# Patient Record
Sex: MALE | Race: WHITE | Employment: FULL TIME | URBAN - METROPOLITAN AREA
[De-identification: names, ages, dates, MRNs, and addresses within clinical notes are randomized per-mention and may not be internally consistent; named-entity substitution may affect disease eponyms.]

---

## 2020-10-12 PROBLEM — N20.0 BILATERAL KIDNEY STONES: Status: ACTIVE | Noted: 2019-10-01

## 2020-10-12 PROBLEM — U07.1 COVID-19: Status: ACTIVE | Noted: 2020-10-12

## 2020-10-19 PROBLEM — U07.1 PNEUMONIA DUE TO COVID-19 VIRUS: Status: ACTIVE | Noted: 2020-10-19

## 2020-10-19 PROBLEM — J12.82 PNEUMONIA DUE TO COVID-19 VIRUS: Status: ACTIVE | Noted: 2020-10-19

## 2020-10-20 ENCOUNTER — HOSPITAL ENCOUNTER (INPATIENT)
Age: 55
LOS: 6 days | Discharge: HOME | DRG: 871 | End: 2020-10-26
Attending: INTERNAL MEDICINE | Admitting: INTERNAL MEDICINE
Payer: COMMERCIAL

## 2020-10-20 PROBLEM — J96.01 ACUTE RESPIRATORY FAILURE WITH HYPOXIA (HCC): Status: ACTIVE | Noted: 2020-10-20

## 2020-10-20 PROBLEM — F51.01 PRIMARY INSOMNIA: Status: ACTIVE | Noted: 2020-10-20

## 2020-10-20 LAB
ABO/RH: NORMAL
ANTIBODY SCREEN: NEGATIVE
ARM BAND NUMBER: NORMAL
BLOOD BANK SPECIMEN: NORMAL
EXPIRATION DATE: NORMAL

## 2020-10-20 PROCEDURE — 86900 BLOOD TYPING SEROLOGIC ABO: CPT

## 2020-10-20 PROCEDURE — 99222 1ST HOSP IP/OBS MODERATE 55: CPT | Performed by: INTERNAL MEDICINE

## 2020-10-20 PROCEDURE — 86901 BLOOD TYPING SEROLOGIC RH(D): CPT

## 2020-10-20 PROCEDURE — XW033E5 INTRODUCTION OF REMDESIVIR ANTI-INFECTIVE INTO PERIPHERAL VEIN, PERCUTANEOUS APPROACH, NEW TECHNOLOGY GROUP 5: ICD-10-PCS | Performed by: INTERNAL MEDICINE

## 2020-10-20 PROCEDURE — 2580000003 HC RX 258: Performed by: NURSE PRACTITIONER

## 2020-10-20 PROCEDURE — 2500000003 HC RX 250 WO HCPCS: Performed by: INTERNAL MEDICINE

## 2020-10-20 PROCEDURE — 97530 THERAPEUTIC ACTIVITIES: CPT

## 2020-10-20 PROCEDURE — 2000000000 HC ICU R&B

## 2020-10-20 PROCEDURE — 2580000003 HC RX 258: Performed by: INTERNAL MEDICINE

## 2020-10-20 PROCEDURE — 97161 PT EVAL LOW COMPLEX 20 MIN: CPT

## 2020-10-20 PROCEDURE — 6370000000 HC RX 637 (ALT 250 FOR IP): Performed by: NURSE PRACTITIONER

## 2020-10-20 PROCEDURE — 99255 IP/OBS CONSLTJ NEW/EST HI 80: CPT | Performed by: INTERNAL MEDICINE

## 2020-10-20 PROCEDURE — 6370000000 HC RX 637 (ALT 250 FOR IP): Performed by: INTERNAL MEDICINE

## 2020-10-20 PROCEDURE — 86850 RBC ANTIBODY SCREEN: CPT

## 2020-10-20 PROCEDURE — 83605 ASSAY OF LACTIC ACID: CPT

## 2020-10-20 PROCEDURE — 6360000002 HC RX W HCPCS: Performed by: NURSE PRACTITIONER

## 2020-10-20 PROCEDURE — XW13325 TRANSFUSION OF CONVALESCENT PLASMA (NONAUTOLOGOUS) INTO PERIPHERAL VEIN, PERCUTANEOUS APPROACH, NEW TECHNOLOGY GROUP 5: ICD-10-PCS | Performed by: INTERNAL MEDICINE

## 2020-10-20 PROCEDURE — 6360000002 HC RX W HCPCS: Performed by: INTERNAL MEDICINE

## 2020-10-20 RX ORDER — ALBUTEROL SULFATE 2.5 MG/3ML
2.5 SOLUTION RESPIRATORY (INHALATION)
Status: DISCONTINUED | OUTPATIENT
Start: 2020-10-20 | End: 2020-10-26 | Stop reason: HOSPADM

## 2020-10-20 RX ORDER — ACETAMINOPHEN 650 MG/1
650 SUPPOSITORY RECTAL EVERY 6 HOURS PRN
Status: DISCONTINUED | OUTPATIENT
Start: 2020-10-20 | End: 2020-10-26 | Stop reason: HOSPADM

## 2020-10-20 RX ORDER — ACETAMINOPHEN 325 MG/1
650 TABLET ORAL EVERY 6 HOURS PRN
Status: DISCONTINUED | OUTPATIENT
Start: 2020-10-20 | End: 2020-10-26 | Stop reason: HOSPADM

## 2020-10-20 RX ORDER — POLYVINYL ALCOHOL 14 MG/ML
1 SOLUTION/ DROPS OPHTHALMIC PRN
Status: DISCONTINUED | OUTPATIENT
Start: 2020-10-20 | End: 2020-10-26 | Stop reason: HOSPADM

## 2020-10-20 RX ORDER — NICOTINE 21 MG/24HR
1 PATCH, TRANSDERMAL 24 HOURS TRANSDERMAL DAILY PRN
Status: DISCONTINUED | OUTPATIENT
Start: 2020-10-20 | End: 2020-10-26 | Stop reason: HOSPADM

## 2020-10-20 RX ORDER — SODIUM CHLORIDE 0.9 % (FLUSH) 0.9 %
10 SYRINGE (ML) INJECTION PRN
Status: DISCONTINUED | OUTPATIENT
Start: 2020-10-20 | End: 2020-10-26 | Stop reason: HOSPADM

## 2020-10-20 RX ORDER — SODIUM CHLORIDE 0.9 % (FLUSH) 0.9 %
10 SYRINGE (ML) INJECTION EVERY 12 HOURS SCHEDULED
Status: DISCONTINUED | OUTPATIENT
Start: 2020-10-20 | End: 2020-10-26 | Stop reason: HOSPADM

## 2020-10-20 RX ORDER — PROMETHAZINE HYDROCHLORIDE 12.5 MG/1
12.5 TABLET ORAL EVERY 6 HOURS PRN
Status: DISCONTINUED | OUTPATIENT
Start: 2020-10-20 | End: 2020-10-26 | Stop reason: HOSPADM

## 2020-10-20 RX ORDER — IPRATROPIUM BROMIDE AND ALBUTEROL SULFATE 2.5; .5 MG/3ML; MG/3ML
1 SOLUTION RESPIRATORY (INHALATION)
Status: DISCONTINUED | OUTPATIENT
Start: 2020-10-20 | End: 2020-10-20

## 2020-10-20 RX ORDER — 0.9 % SODIUM CHLORIDE 0.9 %
20 INTRAVENOUS SOLUTION INTRAVENOUS ONCE
Status: COMPLETED | OUTPATIENT
Start: 2020-10-20 | End: 2020-10-20

## 2020-10-20 RX ORDER — ONDANSETRON 2 MG/ML
4 INJECTION INTRAMUSCULAR; INTRAVENOUS EVERY 6 HOURS PRN
Status: DISCONTINUED | OUTPATIENT
Start: 2020-10-20 | End: 2020-10-26 | Stop reason: HOSPADM

## 2020-10-20 RX ORDER — SODIUM CHLORIDE 9 MG/ML
INJECTION, SOLUTION INTRAVENOUS CONTINUOUS
Status: DISCONTINUED | OUTPATIENT
Start: 2020-10-20 | End: 2020-10-20

## 2020-10-20 RX ORDER — 0.9 % SODIUM CHLORIDE 0.9 %
30 INTRAVENOUS SOLUTION INTRAVENOUS PRN
Status: DISCONTINUED | OUTPATIENT
Start: 2020-10-20 | End: 2020-10-26 | Stop reason: HOSPADM

## 2020-10-20 RX ORDER — ZOLPIDEM TARTRATE 5 MG/1
5 TABLET ORAL NIGHTLY PRN
Status: DISCONTINUED | OUTPATIENT
Start: 2020-10-20 | End: 2020-10-26 | Stop reason: HOSPADM

## 2020-10-20 RX ADMIN — ENOXAPARIN SODIUM 40 MG: 40 INJECTION SUBCUTANEOUS at 21:03

## 2020-10-20 RX ADMIN — SODIUM CHLORIDE, PRESERVATIVE FREE 10 ML: 5 INJECTION INTRAVENOUS at 15:56

## 2020-10-20 RX ADMIN — SODIUM CHLORIDE 20 ML: 0.9 INJECTION, SOLUTION INTRAVENOUS at 18:32

## 2020-10-20 RX ADMIN — REMDESIVIR 200 MG: 100 INJECTION, POWDER, LYOPHILIZED, FOR SOLUTION INTRAVENOUS at 15:20

## 2020-10-20 RX ADMIN — DEXAMETHASONE 6 MG: 4 TABLET ORAL at 17:41

## 2020-10-20 RX ADMIN — Medication 500 MG: at 21:03

## 2020-10-20 RX ADMIN — ACETAMINOPHEN 650 MG: 325 TABLET ORAL at 21:08

## 2020-10-20 RX ADMIN — ZOLPIDEM TARTRATE 5 MG: 5 TABLET ORAL at 22:44

## 2020-10-20 RX ADMIN — SODIUM CHLORIDE, PRESERVATIVE FREE 10 ML: 5 INJECTION INTRAVENOUS at 20:06

## 2020-10-20 RX ADMIN — SODIUM CHLORIDE: 9 INJECTION, SOLUTION INTRAVENOUS at 15:20

## 2020-10-20 RX ADMIN — CEFTRIAXONE SODIUM 1 G: 1 INJECTION, POWDER, FOR SOLUTION INTRAMUSCULAR; INTRAVENOUS at 20:06

## 2020-10-20 RX ADMIN — POLYVINYL ALCOHOL 1 DROP: 14 SOLUTION/ DROPS OPHTHALMIC at 22:44

## 2020-10-20 ASSESSMENT — PAIN DESCRIPTION - FREQUENCY: FREQUENCY: INTERMITTENT

## 2020-10-20 ASSESSMENT — ENCOUNTER SYMPTOMS
COLOR CHANGE: 0
SHORTNESS OF BREATH: 1
COUGH: 1
ANAL BLEEDING: 0
DIARRHEA: 1
TACHYPNEA: 1
EYE ITCHING: 0
CHEST TIGHTNESS: 0

## 2020-10-20 ASSESSMENT — PAIN DESCRIPTION - LOCATION: LOCATION: BACK

## 2020-10-20 ASSESSMENT — PAIN DESCRIPTION - ONSET: ONSET: ON-GOING

## 2020-10-20 ASSESSMENT — PAIN DESCRIPTION - DESCRIPTORS: DESCRIPTORS: ACHING

## 2020-10-20 ASSESSMENT — PAIN DESCRIPTION - ORIENTATION: ORIENTATION: LOWER

## 2020-10-20 ASSESSMENT — PAIN DESCRIPTION - PROGRESSION: CLINICAL_PROGRESSION: NOT CHANGED

## 2020-10-20 ASSESSMENT — PAIN DESCRIPTION - PAIN TYPE: TYPE: ACUTE PAIN

## 2020-10-20 ASSESSMENT — PAIN SCALES - GENERAL
PAINLEVEL_OUTOF10: 0
PAINLEVEL_OUTOF10: 6

## 2020-10-20 NOTE — CARE COORDINATION
Case Management Initial Discharge Plan  zaria Carson  Called patient to verify information       Information verified: address, contacts, phone number, , insurance Yes    Emergency Contact/Next of Kin name & number: sister nabila    PCP: Isidra Jean MD  Date of last visit:   See pcp every 6 months    Insurance Provider: medical mutual    Discharge Planning    Living Arrangements:    lives alone  Support Systems:   family    Home has 2 stories    Location of bedroom/bathroom in home 2nd    Patient able to perform ADL's:Independent    Current Services (outpatient & in home) none  DME equipment: none  pharmacy: [facility]    Receiving oral anticoagulation therapy? No          Potential Assistance Needed:   f/u pcp        Prior SNF/Rehab Placement and Facility: none       Evaluation: no    Expected Discharge date:   10/24    Patient expects to be discharged to:   home  Follow Up Appointment: Best Day/ Time:  afternoon    Transportation provider: family  Transportation arrangements needed for discharge: no    Readmission Risk              Risk of Unplanned Readmission:        15             Does patient have a readmission risk score greater than 14?: Yes  If yes, follow-up appointment must be made within 7 days of discharge.      Goals of Care: return to adl without shortness of breath       Discharge Plan: return home independent           Electronically signed by Rosa Maria Mesa RN on 10/20/20 at 5:55 PM EDT

## 2020-10-20 NOTE — H&P
[facility]  Office: [address], DO, Lux Kras, DO, Solange Sebastian, DO, Valente Peyman Blood, DO, Hans Gee MD, Darcie Montes MD, Taylor Ordaz MD, Macho Juarez MD, Jim Mello MD, Justin Dallas MD, Karen Arthur MD, Rossy Mckeon MD, Ranjit Bee MD, Heaven Oropeza, DO, Nancy Hernandez MD, Demond Hsu MD, Pasquale Rushing, DO, Ary Wellington MD,  Presley Daniels, DO, Kaiden Villatoro MD, Amaris Rios MD, Roland Monsalve [facility], [facility], CNP, Yon Castellano, CNP, Gretchen Mayo, CNS, Mohit Arita, CNP, Emanuel Section, CNP, Gurvinder Cary, CNP, Karina Hines, CNP, Charlene Solders, CNP, Kj Pascual PA-C, Victorino Gutierrez DNP, Bobo Kidd, CNP, Orysia Soda, CNP, Wyottonielia Husbands, CNP, Colletta Moros, CNP, Shana Trujillo, [facility]    HISTORY AND PHYSICAL EXAMINATION            Date:   10/20/2020  Patient name:  Magnolia Tamez  Date of admission:  10/20/2020 11:14 AM  MRN:   [id number]  Account:  [de-identified]  YOB: 1965  PCP:    Bre Wilcox MD  Room:   [address] [place] [address]  Code Status:    Full Code    Chief Complaint:     No chief complaint on file. SOB,     History Obtained From:     patient    History of Present Illness:     Magnolia Tamez is a 60 y.o. Non-/non  male who presents with No chief complaint on file. and is admitted to the hospital for the management of Acute respiratory failure with hypoxia ([facility] [address]   A 38-year-old gentleman with underlying history of hypertension presented with increased shortness of breath which started few days back, ended up into the ER with increasing need of oxygen, his shortness of breath and cough started on October 9, tested positive for Covid on October 12 subsequently got worse and more short of breath and went to the local ER on October 19 with diffuse Covid related pneumonia, needing oxygen, D-dimer was 820, CT of the chest did not show increase in urination, hot or cold intolerance  MUSCULOSKELETAL:  negative joint pains, muscle aches, swelling of joints  NEUROLOGICAL:  negative for headaches, dizziness, lightheadedness, numbness, pain, tingling extremities  BEHAVIOR/PSYCH:  negative for depression, anxiety    Physical Exam:   BP (!) 123/59   Pulse 94   Resp 25   Ht 5' 9\" (1.753 m)   Wt 230 lb 9.6 oz (104.6 kg)   SpO2 91%   BMI 34.05 kg/m²   Temp (24hrs), Av.3 °F (37.9 °C), Min:97.7 °F (36.5 °C), Max:102.7 °F (39.3 °C)    No results for input(s): POCGLU in the last 72 hours.   No intake or output data in the 24 hours ending 10/20/20 1702    General Appearance: alert, well appearing, and in no acute distress  Mental status: oriented to person, place, and time  Head: normocephalic, atraumatic  Eye: no icterus, redness, pupils equal and reactive, extraocular eye movements intact, conjunctiva clear  Ear: normal external ear, no discharge, hearing intact  Nose: no drainage noted  Mouth: mucous membranes moist  Neck: supple, no carotid bruits, thyroid not palpable  Lungs: Bilateral coarse breath sounds,  Cardiovascular: normal rate, regular rhythm, no murmur, gallop, rub  Abdomen: Soft, nontender, nondistended, normal bowel sounds, no hepatomegaly or splenomegaly  Neurologic: There are no new focal motor or sensory deficits, normal muscle tone and bulk, no abnormal sensation, normal speech, cranial nerves II through XII grossly intact  Skin: No gross lesions, rashes, bruising or bleeding on exposed skin area  Extremities: peripheral pulses palpable, no pedal edema or calf pain with palpation  Psych: normal affect    Investigations:      Laboratory Testing:  Recent Results (from the past 24 hour(s))   CK    Collection Time: 10/20/20  4:43 AM   Result Value Ref Range     49 - 397 U/L   Comprehensive Metabolic Panel w/ Reflex to MG    Collection Time: 10/20/20  4:43 AM   Result Value Ref Range    Glucose 173 (H) 65 - 100 mg/dL    BUN 17 9 - 20 mg/dL    Creatinine 0.72 0.66 - 1.25 mg/dL    Sodium 139 135 - 145 mEq/L    Potassium 4.5 3.6 - 5.0 mEq/L    Chloride 105 98 - 107 mEq/L    CO2 22 22 - 32 mEq/L    Calcium 7.9 (L) 8.4 - 10.2 mg/dL    Alb 3.2 (L) 3.5 - 5.0 g/dL    Total Protein 6.1 (L) 6.3 - 8.2 g/dL    Total Bilirubin 1.0 0.2 - 1.3 mg/dL    Alkaline Phosphatase 62 38 - 126 U/L     (H) 17 - 59 U/L     (H) 0 - 50 U/L    EGFR IF NonAfrican American 130 >60 mL/min/1.73m2    eGFR African American 112 >60 mL/min/1.73m2   Lactic acid, plasma    Collection Time: 10/20/20  4:43 AM   Result Value Ref Range    Lactic Acid 1.6 1.0 - 2.2 mmol/L   Magnesium    Collection Time: 10/20/20  4:43 AM   Result Value Ref Range    Magnesium 2.0 1.6 - 2.3 mg/dL   CBC Auto Differential    Collection Time: 10/20/20  4:43 AM   Result Value Ref Range    WBC 11.1 (H) 3.7 - 11.0 10 X 3/mm^3    RBC 4.50 (L) 4.70 - 6.10 10 X 6/mm^3    Hemoglobin 14.1 13.5 - 17.5 g/dL    Hematocrit 42.1 42.0 - 52.0 %    MCV 94 80 - 100 fL    MCH 31.3 27.0 - 35.0 pg    MCHC 33.5 32.0 - 36.0 g/dL    RDW-CV 12.7 11.5 - 14.5 %    Platelets 121 564 - 056 uLx10^3    MPV 8.7 (L) 9.4 - 12.3 fL    Neutrophils % 85 (H) 50 - 70 %    Neutrophils Absolute 9.4 (H) 1.8 - 7.7 10x3/mm^3    Lymphocyte % 11 (L) 20 - 40 %    Lymphocytes Absolute 1.2 1.0 - 4.0 10x3/mm^3    Eosinophils % 0 0 - 5 %    Eosinophils Absolute 0.0 0.0 - 0.5 10x3/mm^3    Monocytes % 3 1 - 15 %    Monocytes Absolute 0.4 0.3 - 1.0 10x3/mm^3    Basophils % 0 0 - 1 %    Basophils Absolute 0.0 0.0 - 0.1 10x3/mm^3    Immature Granulocytes % 0.50 <5.00    Immature Granulocytes # 0.05 0.00 - 0.30   TYPE AND SCREEN    Collection Time: 10/20/20 10:59 AM   Result Value Ref Range    Expiration Date 10/23/2020,2358     Arm Band Number [id number]     ABO/Rh O POSITIVE     Antibody Screen NEGATIVE    BLOOD BANK SPECIMEN    Collection Time: 10/20/20 11:48 AM   Result Value Ref Range    Blood Bank Specimen NOT REPORTED Imaging/Diagnostics:  Cta Chest W Contrast    Result Date: 10/19/2020  1. Study degraded by motion. 2. No evidence of central PE or secondary signs of PE. 3. Multilobar groundglass opacities in a peripheral distribution raises suspicion of multilobar pneumonia, potentially of viral etiology. Assessment :      Hospital Problems           Last Modified POA    * (Principal) Acute respiratory failure with hypoxia ([facility] [address] 10/20/2020 Yes    HTN (hypertension), benign 10/20/2020 Yes    Pneumonia due to COVID-19 virus 10/20/2020 Yes    Sepsis due to COVID-19 [facility]) 10/20/2020 Yes    Primary insomnia 10/20/2020 Yes          Plan:     Patient status inpatient in the Progressive Unit/Step down    1. Covid pneumonia, acute respiratory failure, sepsis, infectious disease already on board, started on remdesivir, consent for plasma,  2. Continue to monitor oxygen saturations, decreased need from 10 L to 5 L now,  3. Monitor labs,  4. DVT prophylaxis,  5. Ambien for insomnia,  6. Full CODE STATUS    Consultations:   IP CONSULT TO INFECTIOUS DISEASES     Patient is admitted as inpatient status because of co-morbidities listed above, severity of signs and symptoms as outlined, requirement for current medical therapies and most importantly because of direct risk to patient if care not provided in a hospital setting. Expected length of stay > 48 hours.     Nyoka Hodgkins, MD  10/20/2020  5:02 PM    Copy sent to Dr. Lora Naik MD

## 2020-10-20 NOTE — PROGRESS NOTES
Physical Therapy    Facility/Department: [facility]  Initial Assessment    NAME: Karen Ghotra  : 1965  MRN: [id number]    Date of Service: 10/20/2020  Mahnaz Goodman is a 24-year-old male who presented to the emergency room on  complaining of one week of fever, shortness of breath, and nonproductive cough. He was seen in a [facility] screening clinic 1 week ago and diagnosed with this infection. No specific intervention was given other than self-isolation and symptom control at that time. Symptoms have become significantly worse over the last 24 hours as he has developed a fever, headache, generalized weakness, and worsening of his nonproductive cough along with onset of shortness of breath. He presented to the emergency room due to these findings. He has been taking benzonatate capsules for his cough without improvement. He also has had intermittent diarrhea and abdominal cramping he correlates to his coughing. 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 His shortness of breath is worse with activity and improves with rest.  It is persistent, not intermittent. Discharge Recommendations:  No further therapy required at discharge. PT Equipment Recommendations  Equipment Needed: No    Assessment    Pt is functioning independently. I educated him re: the importance of being OOB more than in the bed--pt very agreeable to stay up in the [facility] chair. Prognosis: Good  Decision Making: Low Complexity  PT Education: Goals;PT Role;Plan of Care  Barriers to Learning: none  No Skilled PT: Independent with functional mobility   REQUIRES PT FOLLOW UP: No  Activity Tolerance  Activity Tolerance: Patient Tolerated treatment well       Patient Diagnosis(es): There were no encounter diagnoses. has a past medical history of Hypertension. has a past surgical history that includes Lithotripsy.     Restrictions  Restrictions/Precautions  Restrictions/Precautions: General Precautions, Isolation, Contact Precautions, Up as Tolerated(COVID-19 +)  Required Braces or Orthoses?: No  Vision/Hearing  Vision: Impaired  Vision Exceptions: Wears glasses at all times  Hearing: Within functional limits     Subjective  General  Patient assessed for rehabilitation services?: Yes  Response To Previous Treatment: Not applicable  Family / Caregiver Present: No  Follows Commands: Within Functional Limits  Pain Screening  Patient Currently in Pain: Denies  Vital Signs  Patient Currently in Pain: Denies       Orientation  Orientation  Overall Orientation Status: Within Normal Limits  Social/Functional History  Social/Functional History  Lives With: Alone  Type of Home: House  Home Layout: Two level, Bed/Bath upstairs(no bathroom on the main floor)  Home Access: Stairs to enter with rails  Entrance Stairs - Number of Steps: 6  ADL Assistance: Independent  Ambulation Assistance: Independent  Transfer Assistance: Independent  Active : Yes  Mode of Transportation: Car  Occupation: Full time employment  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    Objective     Observation/Palpation  Posture: Good    AROM RLE (degrees)  RLE AROM: WFL  AROM LLE (degrees)  LLE AROM : WFL  AROM RUE (degrees)  RUE AROM : WFL  AROM LUE (degrees)  LUE AROM : WFL  Strength RLE  Strength RLE: WNL  Strength LLE  Strength LLE: WNL  Strength RUE  Strength RUE: WNL  Strength LUE  Strength LUE: WNL  Tone RLE  RLE Tone: Normotonic  Tone LLE  LLE Tone: Normotonic  Motor Control  Gross Motor?: WFL  Sensation  Overall Sensation Status: WFL  Bed mobility  Rolling to Right: Independent  Supine to Sit: Independent  Scooting: Independent  Transfers  Sit to Stand: Independent  Stand to sit:  Independent  Bed to Chair: Independent  Stand Pivot Transfers: Independent  Ambulation  Ambulation?: Yes  Ambulation 1  Surface: level tile  Device: No Device  Other Apparatus: O2  Assistance: Independent  Gait Deviations: None  Distance: 12'x2  Stairs/Curb  Stairs?: No Balance  Posture: Good  Sitting - Static: Good  Sitting - Dynamic: Good  Standing - Static: Good  Standing - Dynamic: Good        Plan   Plan  Times per week: DC PT--pt is independent  Safety Devices  Type of devices: Call light within reach, Gait belt, Left in chair, Nurse notified  Restraints  Initially in place: No    AM-PAC Score  AM-PAC Inpatient Mobility Raw Score : 23 (10/20/20 1334)  AM-PAC Inpatient T-Scale Score : 56.93 (10/20/20 1334)  Mobility Inpatient CMS 0-100% Score: 11.2 (10/20/20 1334)  Mobility Inpatient CMS G-Code Modifier : CI (10/20/20 1334)          Goals  Short term goals  Time Frame for Short term goals: 1 visit  Short term goal 1: evaluate and give recommendations: pt is independent; continued PT not necessary  Patient Goals   Patient goals : get better, go home       Therapy Time   Individual Concurrent Group Co-treatment   Time In [address]         Time Out 1331         Minutes 36                 Vish Sharma, PT

## 2020-10-20 NOTE — CONSULTS
[facility] -   Infectious diseases evaluation  admission date 10/20/2020    reason for consultation:   covid    Impression :   Current:  · covid diffuse illness  X 10/9/20  · Diffuse covid pneumonia   · Sepsis from covid  · lymphopenia  · elevated d-dimer 800  · transaminitis from covid    Other:  ·   Discussion / summary of stay / plan of care   ·   Recommendations   · Remdesevir x 5 days till 10/24  · Watch LFT  · Steroids and lovenox started 10/19  · Watch inflamm markers  · 1 Unit of plasma - pt consented 10/20/20    Infection Control Recommendations   · [place] Precautions  · Contact Isolation   · Airborne isolation  · Droplet Isolation      Antimicrobial Stewardship Recommendations   · Simplification of therapy  · Targeted therapy    Coordination ofOutpatient Care:   · Estimated Length of IV antimicrobials:  · Patient will need Midline / picc Catheter Insertion:   · Patient will need SNF:  · Patient will need outpatient wound care:     History of Present Illness:   Initial history:  Rena Ni is a 60y.o.-year-old male w cough and sob x 10/9 and tested + covid 10/12- got worse and more SOB and went to [facility] 10/19 w diffuse covid . pniu and needing O2, D-dimer 820, transferred to us this am, liver enz elevated and lymphopenia, lost wet x ;ast week due to diarrhea, poor appetite till today when he started eating. Fever and dry cough, SOB and diarrhea, no rash, were his symptoms  No sore throat and no poor smell of the food.   Liver enz     Interval changes  10/20/2020       Summary of relevant labs:  Labs:        From [facility]:      WBC 11  ALC low  Creat normal  D-Dimer 820  Micro:  covid + outreach 10/12  Imaging:  CT chest 10/19 multifocal ground glass covid like pneumonia     I have personally reviewed the past medical history, past surgical history, medications, social history, and family history, and I haveupdated the database accordingly.   Past Medical History:     Past Medical History:   Diagnosis Date    Hypertension        Past Surgical  History:     Past Surgical History:   Procedure Laterality Date    LITHOTRIPSY      x4 all together       Medications:      dexamethasone  6 mg Oral Daily    sodium chloride flush  10 mL Intravenous 2 times per day    azithromycin  500 mg Intravenous Q24H    And    cefTRIAXone (ROCEPHIN) IV  1 g Intravenous Q24H    [START ON 10/21/2020] influenza virus vaccine  0.5 mL Intramuscular Once    enoxaparin  40 mg Subcutaneous BID    remdesivir IVPB  200 mg Intravenous Once    Followed by   Luis Carlos Gracia ON 10/21/2020] remdesivir IVPB  100 mg Intravenous Q24H    sodium chloride  20 mL Intravenous Once       Social History:     Social History     Socioeconomic History    Marital status: Unknown     Spouse name: Not on file    Number of children: Not on file    Years of education: Not on file    Highest education level: Not on file   Occupational History    Not on file   Social Needs    Financial resource strain: Not on file    Food insecurity     Worry: Not on file     Inability: Not on file   [employer] needs     Medical: Not on file     Non-medical: Not on file   Tobacco Use    Smoking status: Former Smoker     Years: 20.00    Smokeless tobacco: Never Used   Substance and Sexual Activity    Alcohol use: Never     Frequency: Never    Drug use: Never    Sexual activity: Not on file   Lifestyle    Physical activity     Days per week: Not on file     Minutes per session: Not on file    Stress: Not on file   Relationships    Social connections     Talks on phone: Not on file     Gets together: Not on file     Attends Worship service: Not on file     Active member of club or organization: Not on file     Attends meetings of clubs or organizations: Not on file     Relationship status: Not on file    Intimate partner violence     Fear of current or ex partner: Not on file Emotionally abused: Not on file     Physically abused: Not on file     Forced sexual activity: Not on file   Other Topics Concern    Not on file   Social History Narrative    Not on file       Family History:   History reviewed. No pertinent family history. Allergies:   No known allergies     Review of Systems:     Review of Systems   Constitutional: Positive for fatigue and fever. Negative for activity change and appetite change. HENT: Negative for congestion. Eyes: Negative for itching. Respiratory: Positive for cough and shortness of breath. Negative for chest tightness. Cardiovascular: Negative for chest pain. Gastrointestinal: Positive for diarrhea. Negative for anal bleeding. Endocrine: Negative for heat intolerance. Genitourinary: Negative for dysuria. Musculoskeletal: Negative for arthralgias. Skin: Negative for color change. Allergic/Immunologic: Negative for immunocompromised state. Neurological: Negative for facial asymmetry. Hematological: Negative for adenopathy. Psychiatric/Behavioral: Negative for agitation. Physical Examination :     Patient Vitals for the past 8 hrs:   BP Pulse Resp SpO2 Height Weight   10/20/20 1215 -- -- -- -- 5' 9\" (1.753 m) 230 lb 9.6 oz (104.6 kg)   10/20/20 1127 (!) 122/49 98 28 90 % -- --   10/20/20 1126 -- 97 (!) 35 90 % -- --   10/20/20 1125 -- 97 (!) 36 (!) 89 % -- --   10/20/20 1124 -- 98 26 92 % -- --   10/20/20 1123 -- 98 24 91 % -- --   10/20/20 1122 -- 96 27 92 % -- --   10/20/20 1121 -- 98 29 91 % -- --   10/20/20 1120 -- 98 (!) 36 90 % -- --   10/20/20 1119 -- 100 (!) 31 (!) 89 % -- --   10/20/20 1118 -- 100 (!) 31 90 % -- --   10/20/20 1117 -- 100 (!) 32 (!) 89 % -- --   10/20/20 1116 -- 101 (!) 34 (!) 89 % -- --   10/20/20 1115 -- 101 (!) 37 (!) 88 % -- --       Physical Exam  Constitutional:       General: He is not in acute distress. Appearance: He is normal weight. HENT:      Head: Normocephalic and atraumatic. Nose: No congestion. Mouth/Throat:      Mouth: Mucous membranes are moist.   Eyes:      General: No scleral icterus. Conjunctiva/sclera: Conjunctivae normal.      Pupils: Pupils are equal, round, and reactive to light. Neck:      Musculoskeletal: Neck supple. No neck rigidity. Cardiovascular:      Rate and Rhythm: Normal rate and regular rhythm. Heart sounds: Normal heart sounds. No murmur. Pulmonary:      Effort: No respiratory distress. Breath sounds: Rales present. Abdominal:      General: There is no distension. Palpations: Abdomen is soft. Tenderness: There is no abdominal tenderness. Genitourinary:     Comments: No sands  Musculoskeletal:         General: No swelling or deformity. Skin:     General: Skin is dry. Coloration: Skin is not jaundiced. Neurological:      General: No focal deficit present. Mental Status: He is alert and oriented to person, place, and time. Psychiatric:         Mood and Affect: Mood normal.         Thought Content: Thought content normal.           Medical Decision Making:   I have independently reviewed/ordered the following labs:    CBC with Differential:   Recent Labs     10/19/20  1221 10/20/20  0443   WBC 9.4 11.1*   HGB 14.5 14.1   HCT 41.9* 42.1    296   LYMPHOPCT 16* 11*     BMP:  Recent Labs     10/19/20  1221 10/20/20  0443 10/20/20  0443    139  --    K 3.9 4.5  --     105  --    CO2 27 22  --    BUN 16 17  --    CREATININE 0.80 0.72  --    MG  --   --  2.0     Hepatic Function Panel:   Recent Labs     10/20/20  0443   PROT 6.1*   LABALBU 3.2*   BILITOT 1.0   ALKPHOS 62   *   *     No results for input(s): RPR in the last 72 hours. No results for input(s): HIV in the last 72 hours. No results for input(s): BC in the last 72 hours. Lab Results   Component Value Date    CREATININE 0.72 10/20/2020    GLUCOSE 173 10/20/2020       Detailed results:         Thank you for allowing us to participate in the care of this patient. Please call with questions. This note is created with the assistance of a speech recognition program.  While intending to generate adocument that actually reflects the content of the visit, the document can still have some errors including those of syntax and sound a like substitutions which may escape proof reading. It such instances, actual meaningcan be extrapolated by contextual diversion.     Dario Lay MD  Office: [phone]  Perfect [employer] / office [phone]

## 2020-10-21 ENCOUNTER — APPOINTMENT (OUTPATIENT)
Dept: GENERAL RADIOLOGY | Age: 55
DRG: 871 | End: 2020-10-21
Attending: INTERNAL MEDICINE
Payer: COMMERCIAL

## 2020-10-21 LAB
ABSOLUTE EOS #: 0 K/UL (ref 0–0.4)
ABSOLUTE IMMATURE GRANULOCYTE: 0.25 K/UL (ref 0–0.3)
ABSOLUTE LYMPH #: 1.01 K/UL (ref 1–4.8)
ABSOLUTE MONO #: 0.63 K/UL (ref 0.1–0.8)
ALBUMIN SERPL-MCNC: 3 G/DL (ref 3.5–5.2)
ALBUMIN/GLOBULIN RATIO: 0.8 (ref 1–2.5)
ALP BLD-CCNC: 66 U/L (ref 40–129)
ALT SERPL-CCNC: 159 U/L (ref 5–41)
ANION GAP SERPL CALCULATED.3IONS-SCNC: 12 MMOL/L (ref 9–17)
AST SERPL-CCNC: 96 U/L
BASOPHILS # BLD: 0 % (ref 0–2)
BASOPHILS ABSOLUTE: 0 K/UL (ref 0–0.2)
BILIRUB SERPL-MCNC: 0.47 MG/DL (ref 0.3–1.2)
BLD PROD TYP BPU: NORMAL
BUN BLDV-MCNC: 17 MG/DL (ref 6–20)
BUN/CREAT BLD: ABNORMAL (ref 9–20)
CALCIUM SERPL-MCNC: 8.9 MG/DL (ref 8.6–10.4)
CHLORIDE BLD-SCNC: 106 MMOL/L (ref 98–107)
CO2: 24 MMOL/L (ref 20–31)
CREAT SERPL-MCNC: 0.67 MG/DL (ref 0.7–1.2)
D-DIMER QUANTITATIVE: 0.46 MG/L FEU
DIFFERENTIAL TYPE: ABNORMAL
DISPENSE STATUS BLOOD BANK: NORMAL
EOSINOPHILS RELATIVE PERCENT: 0 % (ref 1–4)
FIBRINOGEN: 620 MG/DL (ref 140–420)
GFR AFRICAN AMERICAN: >60 ML/MIN
GFR NON-AFRICAN AMERICAN: >60 ML/MIN
GFR SERPL CREATININE-BSD FRML MDRD: ABNORMAL ML/MIN/{1.73_M2}
GFR SERPL CREATININE-BSD FRML MDRD: ABNORMAL ML/MIN/{1.73_M2}
GLUCOSE BLD-MCNC: 112 MG/DL (ref 75–110)
GLUCOSE BLD-MCNC: 122 MG/DL (ref 75–110)
GLUCOSE BLD-MCNC: 149 MG/DL (ref 70–99)
HCT VFR BLD CALC: 43.8 % (ref 40.7–50.3)
HEMOGLOBIN: 14.4 G/DL (ref 13–17)
IMMATURE GRANULOCYTES: 2 %
INR BLD: 0.9
LACTIC ACID, SEPSIS WHOLE BLOOD: 2.1 MMOL/L (ref 0.5–1.9)
LACTIC ACID, SEPSIS: ABNORMAL MMOL/L (ref 0.5–1.9)
LYMPHOCYTES # BLD: 8 % (ref 24–44)
MCH RBC QN AUTO: 30.8 PG (ref 25.2–33.5)
MCHC RBC AUTO-ENTMCNC: 32.9 G/DL (ref 28.4–34.8)
MCV RBC AUTO: 93.8 FL (ref 82.6–102.9)
MONOCYTES # BLD: 5 % (ref 1–7)
MORPHOLOGY: NORMAL
NRBC AUTOMATED: 0 PER 100 WBC
PARTIAL THROMBOPLASTIN TIME: 22.7 SEC (ref 20.5–30.5)
PDW BLD-RTO: 12.7 % (ref 11.8–14.4)
PLATELET # BLD: 427 K/UL (ref 138–453)
PLATELET ESTIMATE: ABNORMAL
PMV BLD AUTO: 8.4 FL (ref 8.1–13.5)
POTASSIUM SERPL-SCNC: 4 MMOL/L (ref 3.7–5.3)
PROTHROMBIN TIME: 9.8 SEC (ref 9–12)
RBC # BLD: 4.67 M/UL (ref 4.21–5.77)
RBC # BLD: ABNORMAL 10*6/UL
SEG NEUTROPHILS: 85 % (ref 36–66)
SEGMENTED NEUTROPHILS ABSOLUTE COUNT: 10.71 K/UL (ref 1.8–7.7)
SODIUM BLD-SCNC: 142 MMOL/L (ref 135–144)
TOTAL PROTEIN: 6.6 G/DL (ref 6.4–8.3)
TRANSFUSION STATUS: NORMAL
UNIT DIVISION: 0
UNIT NUMBER: NORMAL
WBC # BLD: 12.6 K/UL (ref 3.5–11.3)
WBC # BLD: ABNORMAL 10*3/UL

## 2020-10-21 PROCEDURE — 71045 X-RAY EXAM CHEST 1 VIEW: CPT

## 2020-10-21 PROCEDURE — 6360000002 HC RX W HCPCS: Performed by: INTERNAL MEDICINE

## 2020-10-21 PROCEDURE — 6360000002 HC RX W HCPCS

## 2020-10-21 PROCEDURE — 36415 COLL VENOUS BLD VENIPUNCTURE: CPT

## 2020-10-21 PROCEDURE — 94660 CPAP INITIATION&MGMT: CPT

## 2020-10-21 PROCEDURE — 6360000002 HC RX W HCPCS: Performed by: NURSE PRACTITIONER

## 2020-10-21 PROCEDURE — 99233 SBSQ HOSP IP/OBS HIGH 50: CPT | Performed by: INTERNAL MEDICINE

## 2020-10-21 PROCEDURE — 2580000003 HC RX 258: Performed by: NURSE PRACTITIONER

## 2020-10-21 PROCEDURE — 85730 THROMBOPLASTIN TIME PARTIAL: CPT

## 2020-10-21 PROCEDURE — 85025 COMPLETE CBC W/AUTO DIFF WBC: CPT

## 2020-10-21 PROCEDURE — 85384 FIBRINOGEN ACTIVITY: CPT

## 2020-10-21 PROCEDURE — 2580000003 HC RX 258: Performed by: INTERNAL MEDICINE

## 2020-10-21 PROCEDURE — 6370000000 HC RX 637 (ALT 250 FOR IP): Performed by: NURSE PRACTITIONER

## 2020-10-21 PROCEDURE — 2000000000 HC ICU R&B

## 2020-10-21 PROCEDURE — 80053 COMPREHEN METABOLIC PANEL: CPT

## 2020-10-21 PROCEDURE — 2500000003 HC RX 250 WO HCPCS: Performed by: INTERNAL MEDICINE

## 2020-10-21 PROCEDURE — 85379 FIBRIN DEGRADATION QUANT: CPT

## 2020-10-21 PROCEDURE — 99232 SBSQ HOSP IP/OBS MODERATE 35: CPT | Performed by: INTERNAL MEDICINE

## 2020-10-21 PROCEDURE — 2700000000 HC OXYGEN THERAPY PER DAY

## 2020-10-21 PROCEDURE — 94761 N-INVAS EAR/PLS OXIMETRY MLT: CPT

## 2020-10-21 PROCEDURE — 82947 ASSAY GLUCOSE BLOOD QUANT: CPT

## 2020-10-21 PROCEDURE — 6370000000 HC RX 637 (ALT 250 FOR IP): Performed by: INTERNAL MEDICINE

## 2020-10-21 PROCEDURE — 85610 PROTHROMBIN TIME: CPT

## 2020-10-21 PROCEDURE — 99291 CRITICAL CARE FIRST HOUR: CPT | Performed by: INTERNAL MEDICINE

## 2020-10-21 RX ORDER — FUROSEMIDE 10 MG/ML
INJECTION INTRAMUSCULAR; INTRAVENOUS
Status: COMPLETED
Start: 2020-10-21 | End: 2020-10-21

## 2020-10-21 RX ORDER — FUROSEMIDE 10 MG/ML
20 INJECTION INTRAMUSCULAR; INTRAVENOUS ONCE
Status: COMPLETED | OUTPATIENT
Start: 2020-10-21 | End: 2020-10-21

## 2020-10-21 RX ORDER — LOPERAMIDE HYDROCHLORIDE 2 MG/1
2 CAPSULE ORAL 4 TIMES DAILY PRN
Status: DISCONTINUED | OUTPATIENT
Start: 2020-10-21 | End: 2020-10-26 | Stop reason: HOSPADM

## 2020-10-21 RX ADMIN — Medication 500 MG: at 22:45

## 2020-10-21 RX ADMIN — SODIUM CHLORIDE, PRESERVATIVE FREE 10 ML: 5 INJECTION INTRAVENOUS at 08:30

## 2020-10-21 RX ADMIN — CEFTRIAXONE SODIUM 1 G: 1 INJECTION, POWDER, FOR SOLUTION INTRAMUSCULAR; INTRAVENOUS at 21:57

## 2020-10-21 RX ADMIN — LOPERAMIDE HYDROCHLORIDE 2 MG: 2 CAPSULE ORAL at 08:28

## 2020-10-21 RX ADMIN — REMDESIVIR 100 MG: 100 INJECTION, POWDER, LYOPHILIZED, FOR SOLUTION INTRAVENOUS at 15:35

## 2020-10-21 RX ADMIN — FUROSEMIDE 20 MG: 10 INJECTION, SOLUTION INTRAMUSCULAR; INTRAVENOUS at 11:40

## 2020-10-21 RX ADMIN — DEXAMETHASONE 6 MG: 4 TABLET ORAL at 21:14

## 2020-10-21 RX ADMIN — ENOXAPARIN SODIUM 40 MG: 40 INJECTION SUBCUTANEOUS at 08:28

## 2020-10-21 RX ADMIN — FUROSEMIDE 20 MG: 10 INJECTION INTRAMUSCULAR; INTRAVENOUS at 11:40

## 2020-10-21 RX ADMIN — SODIUM CHLORIDE, PRESERVATIVE FREE 10 ML: 5 INJECTION INTRAVENOUS at 21:14

## 2020-10-21 RX ADMIN — ENOXAPARIN SODIUM 40 MG: 40 INJECTION SUBCUTANEOUS at 21:14

## 2020-10-21 ASSESSMENT — ENCOUNTER SYMPTOMS
COUGH: 1
DIARRHEA: 1
COLOR CHANGE: 0
ANAL BLEEDING: 0
CHEST TIGHTNESS: 0
EYE ITCHING: 0
SHORTNESS OF BREATH: 1
WHEEZING: 0

## 2020-10-21 ASSESSMENT — PAIN SCALES - GENERAL
PAINLEVEL_OUTOF10: 0

## 2020-10-21 NOTE — PLAN OF CARE
Problem: Airway Clearance - Ineffective  Goal: Achieve or maintain patent airway  Outcome: Ongoing     Problem: Breathing Pattern - Ineffective  Goal: Ability to achieve and maintain a regular respiratory rate  Outcome: Ongoing     Problem:  Body Temperature -  Risk of, Imbalanced  Goal: Ability to maintain a body temperature within defined limits  Outcome: Ongoing

## 2020-10-21 NOTE — PROGRESS NOTES
[facility]  Office: [address], DO, Erinn Salinas, DO, Mela Sims, DO, Codysabrina Miltoners Blood, DO, Ning Hemphill MD, Karon Collins MD, Lucas Neves MD, Quentin Long MD, Tracie Hidalgo MD, Becky Keller MD, Michelle Noguera MD, Americo De Anda MD, Mbonu Dusty Babinski, MD, Ramón Virgen DO, Alexus Navarro MD, Sharon Diaz MD, Sue Arriola DO, Rafael Pugh MD,  Armen Menendez DO, Esha Morin MD, Nicola Alejandre MD, Dao Brower, [facility], [facility], CNP, Mirian Albarran, CNP, Angela Lerner, CNS, Torey Garcia, CNP, Henry Florentino, [facility], Mike Pelayo, CNP, Matthew Powell, CNP, Judi Magaña, CNP, Blas Mejia PA-C, Ebony Ruvalcaba, [facility], Yasmany Salvador, CNP, Alexandru Orellana, CNP, Madiha Agosto, CNP, Jeramie Gallegos, CNP, Joie Lombardi, [address] [place] [address]    Progress Note    10/21/2020    4:11 PM    Name:   Farhat Palmer  MRN:     [id number]     Acct:      [de-identified]   Room:   [address] [place] [address] Day:  1  Admit Date:  10/20/2020 11:14 AM    PCP:   Avelino Swartz MD  Code Status:  Full Code    Subjective:     C/C: No chief complaint on file. SOB  Interval History Status: worsened.      Seen and examined face-to-face,  Discussed with pulmonary,  Infectious disease on board,  Oxygen needs went up last night, from 4 L nasal cannula to 50% FiO2 on BiPAP,  Still very short of breath    Brief History:   A 57-year-old gentleman with underlying history of hypertension presented with increased shortness of breath which started few days back, ended up into the ER with increasing need of oxygen, his shortness of breath and cough started on October 9, tested positive for Covid on October 12 subsequently got worse and more short of breath and went to the local ER on October 19 with diffuse Covid related pneumonia, needing oxygen, D-dimer was 820, CT of the chest did not show any PE but bilateral groundglass multifocal pneumonia consistent with Covid 19 pneumonia.,  Transferred to [address] for further management,  Has already been seen by infectious disease and started on remdesivir, patient already started feeling better      Review of Systems:     Constitutional:  negative for chills, fevers, sweats  Respiratory: Shortness of breath  Cardiovascular:  negative for chest pain, chest pressure/discomfort, lower extremity edema, palpitations  Gastrointestinal:  negative for abdominal pain, constipation, diarrhea, nausea, vomiting  Neurological:  negative for dizziness, headache    Medications: Allergies: Allergies   Allergen Reactions    No Known Allergies        Current Meds:   Scheduled Meds:    dexamethasone  6 mg Oral Daily    sodium chloride flush  10 mL Intravenous 2 times per day    azithromycin  500 mg Intravenous Q24H    And    cefTRIAXone (ROCEPHIN) IV  1 g Intravenous Q24H    influenza virus vaccine  0.5 mL Intramuscular Once    enoxaparin  40 mg Subcutaneous BID    remdesivir IVPB  100 mg Intravenous Q24H     Continuous Infusions:   PRN Meds: loperamide, acetaminophen **OR** acetaminophen, acetaminophen **OR** acetaminophen, albuterol, magnesium hydroxide, nicotine, promethazine **OR** ondansetron, sodium chloride flush, sodium chloride, zolpidem, polyvinyl alcohol    Data:     Past Medical History:   has a past medical history of Hypertension. Social History:   reports that he has quit smoking. He quit after 20.00 years of use. He has never used smokeless tobacco. He reports that he does not drink alcohol or use drugs. Family History: History reviewed. No pertinent family history. Vitals:  BP (!) 123/55   Pulse 83   Temp 98 °F (36.7 °C) (Axillary)   Resp 17   Ht 5' 9\" (1.753 m)   Wt 227 lb (103 kg)   SpO2 92%   BMI 33.52 kg/m²   Temp (24hrs), Av.4 °F (36.9 °C), Min:97.6 °F (36.4 °C), Max:98.8 °F (37.1 °C)    Recent Labs     10/21/20  1139   POCGLU 122*       I/O (24Hr):     Intake/Output Summary (Last 24 hours) at 10/21/2020 1611  Last data filed at 10/21/2020 1210  Gross per 24 hour   Intake 1792.74 ml   Output 600 ml   Net 1192.74 ml       Labs:  Hematology:  Recent Labs     10/19/20  1221 10/19/20  1223 10/20/20  0443 10/21/20  0819   WBC 9.4  --  11.1* 12.6*   RBC 4.56*  --  4.50* 4.67   HGB 14.5  --  14.1 14.4   HCT 41.9*  --  42.1 43.8   MCV 92  --  94 93.8   MCH 31.8  --  31.3 30.8   MCHC 34.6  --  33.5 32.9   RDW  --   --   --  12.7     --  296 427   MPV 8.8*  --  8.7* 8.4   INR  --  0.99  --  0.9   DDIMER 834.00*  --   --  0.46     Chemistry:  Recent Labs     10/19/20  1221 10/20/20  0443 10/20/20  0443 10/21/20  0819    139  --  142   K 3.9 4.5  --  4.0    105  --  106   CO2 27 22  --  24   GLUCOSE 147* 173*  --  149*   BUN 16 17  --  17   CREATININE 0.80 0.72  --  0.67*   MG  --   --  2.0  --    ANIONGAP  --   --   --  12   LABGLOM  --   --   --  >60   GFRAA  --   --   --  >60   CALCIUM 8.1* 7.9*  --  8.9     Recent Labs     10/20/20  0443 10/21/20  0819 10/21/20  1139   PROT 6.1* 6.6  --    LABALBU 3.2* 3.0*  --    * 96*  --    * 159*  --    ALKPHOS 62 66  --    BILITOT 1.0 0.47  --    POCGLU  --   --  122*     ABG:No results found for: POCPH, PHART, PH, POCPCO2, LCD1XXD, PCO2, POCPO2, PO2ART, PO2, POCHCO3, ADU1QYI, HCO3, NBEA, PBEA, BEART, BE, THGBART, THB, TTQ8AHD, TKRD7UVP, U9ZQHPVI, O2SAT, FIO2  No results found for: SPECIAL  No results found for: CULTURE    Radiology:  Xr Chest (single View Frontal)    Result Date: 10/21/2020  Subtle left lung opacities are nonspecific but could represent pneumonia. Right infrahilar opacity may represent atelectasis versus pneumonia. Cta Chest W Contrast    Result Date: 10/19/2020  1. Study degraded by motion. 2. No evidence of central PE or secondary signs of PE. 3. Multilobar groundglass opacities in a peripheral distribution raises suspicion of multilobar pneumonia, potentially of viral etiology.        Physical Examination: General appearance:  alert, cooperative and no distress  Mental Status:  oriented to person, place and time and normal affect  Lungs: Bilateral coarse breath sounds, basal crackles  Heart:  regular rate and rhythm, no murmur  Abdomen:  soft, nontender, nondistended, normal bowel sounds, no masses, hepatomegaly, splenomegaly  Extremities:  no edema, redness, tenderness in the calves  Skin:  no gross lesions, rashes, induration    Assessment:        Hospital Problems           Last Modified POA    * (Principal) Acute respiratory failure with hypoxia ([address] 10/20/2020 Yes    HTN (hypertension), benign 10/20/2020 Yes    Pneumonia due to COVID-19 virus 10/20/2020 Yes    Sepsis due to COVID-19 ([address] 10/20/2020 Yes    Primary insomnia 10/20/2020 Yes          Plan:        1. Covid pneumonia, acute respiratory failure, sepsis, infectious disease on board, pulmonary consulted, started on BiPAP with FiO2 50%, 16 /8 settings,  2. ID started the patient on remdesivir, convalescent plasma, Decadron,  3. Monitor labs,  4. Ambien for insomnia,  5. DVT prophylaxis,  6.  Full CODE STATUS    Amaya Hein MD  10/21/2020  4:11 PM

## 2020-10-21 NOTE — CONSULTS
PULMONARY CONSULT NOTE      Patient:  Rani Marcus  MRN: [id number]    Consulting Physician: Dr. Radha Carlton  Reason for Consult: Acute hypoxic respiratory failure/Covid 19 pneumonia  [facility]: Dean Mullins MD    HISTORY OF PRESENT ILLNESS:   The patient is a 60 y.o. male   Who was transferred from [facility] on 10/20/2020. He was admitted to [facility] on 10/19/2020 when he presented with almost 5 to 6 days history of fever associated with cough gradually worsening shortness of breath, diarrhea with some abdominal cramping without vomiting, headache myalgias body aches and was admitted to ICU there initially he was on nasal cannula and was having increasing requirement of oxygen and was transferred to [place] on 10/19/2020. He initially went to clinic locally and his COVID-19 test was positive on 10/12/2020 per chart. He was having fever while he was in [facility] and started empirically on antibiotic, on Decadron for COVID-19 pneumonia. Since he was transferred here he did not have fever spike. He was initially on nasal cannula but was having worsening hypoxia and this morning he was on 6 L nasal cannula then nonrebreather and developed more respiratory distress tachypnea and was placed on BiPAP and pulmonary service was consulted. When I saw him he was more comfortable according to nursing staff and respiratory therapist while he was on BiPAP he was on 50% 14/7. He is maintaining saturation above 92% while he is on BiPAP. He is hemodynamically stable and no hypotension was reported. His initial lactic acid was 2.4 on 10/19/2020 which improved to 1.6 BUN and creatinine was normal electrolytes were normal.  AST today is 96 , WBC count today is 12.6 hemoglobin 14.4 hematocrit 43.8 platelet count 777. INR is 0.9 fibrinogen 620.   D-dimer is 0.46      Past Medical History:        Diagnosis Date    Hypertension        Past Surgical History:        Procedure Laterality Date    LITHOTRIPSY      x4 all together       Allergies: Allergies   Allergen Reactions    No Known Allergies          Home Meds:   Prior to Admission medications    Medication Sig Start Date End Date Taking? Authorizing Provider   benzonatate (TESSALON) 200 MG capsule Take 1 capsule by mouth 3 times daily as needed for Cough 10/15/20 10/25/20  Rodriguez Lopez MD   lisinopril-hydroCHLOROthiazide (PRINZIDE;ZESTORETIC) 20-12.5 MG per tablet  8/29/20   Historical Provider, MD       Social History:   TOBACCO:   reports that he has quit smoking. He quit after 20.00 years of use. He has never used smokeless tobacco.  ETOH:   reports no history of alcohol use. OCCUPATION:      Family History:   History reviewed. No pertinent family history. Immunizations: There is no immunization history for the selected administration types on file for this patient.     REVIEW OF SYSTEMS:  CONSTITUTIONAL: Positive for  fevers, chills, fatigue, anorexia  EYES:  Negative for  double vision, blurred vision, dry eyes, eye discharge, visual disturbance, irritation, redness, and icterus  HEENT:  Negative for postnasal drip, nasal congestion, epistaxis tinnitus, ear drainage, earaches, sore throat, hoarseness, and voice change  RESPIRATORY: Positive for  dry cough, cough with sputum, dyspnea, negative for wheezing, hemoptysis, chest pain, and pleuritic pain  CARDIOVASCULAR:  Negative for  chest pain, palpitations, orthopnea, PND, exertional chest pressure/discomfort, edema, syncope  GASTROINTESTINAL: Positive for diarrhea, mild abdominal cramps, negative for nausea, vomiting,  constipation,  jaundice, dysphagia, reflux, odynophagia, hematemesis, and hemtochezia  GENITOURINARY:  Negative for frequency, dysuria, nocturia, urinary incontinence, hesitancy, decreased stream, and hematuria  HEMATOLOGIC/LYMPHATIC:  Negative for easy bruising, bleeding, lymphadenopathy, and petechiae  ALLERGIC/IMMUNOLOGIC:  Negative for recurrent infections, urticaria, hay fever, angioedema, anaphylaxis, and drug reactions  ENDOCRINE:  Negative for heat intolerance, cold intolerance, tremor, and weight changes  MUSCULOSKELETAL:  Negative for  myalgias, arthralgias, joint swelling, stiff joints, decreased range of motion, muscle weakness, and bone pain  NEUROLOGICAL: Positive for headaches, negative for dizziness, seizures, memory problems, speech problems, visual disturbance, coordination problems, gait problems, tremor, dysphagia, weakness, numbness, syncope, and tingling  BEHAVIOR/PSYCH: Positive for decreased energy level, decreased concentration, anxiety         Physical Exam:    Vitals: BP (!) 143/70   Pulse 100   Temp 98 °F (36.7 °C) (Axillary)   Resp (!) 31   Ht 5' 9\" (1.753 m)   Wt 227 lb (103 kg)   SpO2 91%   BMI 33.52 kg/m²     Physical Examination:   General appearance - oriented to person, place, and time, overweight and in mild to moderate distress  Mental status - alert, oriented to person, place, and time  Eyes - pupils equal and reactive, extraocular eye movements intact, sclera anicteric  Ears - right ear normal, left ear normal  Nose - normal and patent, no erythema, discharge or polyps  Mouth -on BiPAP  Neck - supple, no significant adenopathy, short thick neck  Chest -mild tachypnea present no retraction or cyanosis, air entry is present bilaterally without rhonchi or expiratory wheezing minimal crackles scattered present at bases  Heart - normal rate, regular rhythm, normal S1, S2, no murmurs, rubs, clicks or gallops  Abdomen - soft, nontender, nondistended, no masses or organomegaly  Neurological - alert, oriented, normal speech, no focal findings or movement disorder noted}  Extremities - peripheral pulses normal, no pedal edema, no clubbing or cyanosis  Skin - normal coloration and turgor, no rashes, no suspicious skin lesions noted     Medications:Current Inpatient    Scheduled Meds:   dexamethasone  6 mg Oral Daily    sodium chloride flush  10 mL Intravenous 2 times per day    azithromycin  500 mg Intravenous Q24H    And    cefTRIAXone (ROCEPHIN) IV  1 g Intravenous Q24H    influenza virus vaccine  0.5 mL Intramuscular Once    enoxaparin  40 mg Subcutaneous BID    remdesivir IVPB  100 mg Intravenous Q24H     Continuous Infusions:  PRN Meds:loperamide, acetaminophen **OR** acetaminophen, acetaminophen **OR** acetaminophen, albuterol, magnesium hydroxide, nicotine, promethazine **OR** ondansetron, sodium chloride flush, sodium chloride, zolpidem, polyvinyl alcohol    LABS:-    CBC:   Recent Labs     10/19/20  1221 10/20/20  0443 10/21/20  0819   WBC 9.4 11.1* 12.6*   HGB 14.5 14.1 14.4    296 427     BMP:    Recent Labs     10/19/20  1221 10/20/20  0443 10/21/20  0819    139 142   K 3.9 4.5 4.0    105 106   CO2 27 22 24   BUN 16 17 17   CREATININE 0.80 0.72 0.67*   GLUCOSE 147* 173* 149*     Hepatic:   Recent Labs     10/20/20  0443 10/21/20  0819   * 96*   * 159*   BILITOT 1.0 0.47   ALKPHOS 62 66     Amylase: No results found for: AMYLASE  Lipase: No results found for: LIPASE  CARDIAC ENZYMES:No results for input(s): CKTOTAL, CKMB, CKMBINDEX, TROPONINI in the last 72 hours. BNP: No results for input(s): BNP in the last 72 hours. Lipids: No results for input(s): CHOL, HDL in the last 72 hours.     Invalid input(s): LDLCALCU  ABGs: No results found for: PHART, PO2ART, NID9YLB  INR:   Recent Labs     10/19/20  1223 10/21/20  0819   INR 0.99 0.9     Thyroid: No results found for: TSH  Urinalysis:   Recent Labs     10/19/20  1631   BACTERIA TRACE   BLOODU Trace*   CLARITYU Clear   COLORU Yellow   RBCUA 0-2   BILIRUBINUR Negative   LEUKOCYTESUR Negative   GLUCOSEU Negative     Cultures:-  -----------------------------------------------------------------  CXR  10/21/2020  Bilateral interstitial/alveolar parenchymal opacities with low lung volumes    CT Scans  10/19/2020  Bilateral patchy areas of groundglass infiltrate present in upper and predominantly lower lung        Assessment and Plan     Covid 19 pneumonia. Acute hypoxemic respiratory failure secondary to COVID-19 pneumonia. Bilateral multifocal parenchymal opacities secondary to pneumonia. Sepsis due to COVID-19. History of hypertension. History of renal calculi. Obesity/possible obstructive sleep apnea. Plan and recommendation:    Continue with BiPAP/noninvasive ventilation at current setting and adjust FiO2  Will continue for 6 hours and then will switch to high flow nasal cannula if tolerated. Will use high flow nasal cannula alternating with BiPAP/noninvasive ventilation. Start and advise prone positioning during sleep and during daytime as much as possible. Monitor oxygenation work of breathing and respiratory symptoms closely as high risk for intubation. We will get chest x-ray as needed. We will give Lasix 20 mg IV now. Advised to keep negative balance or even balance. Avoid IV fluids/minimize IV fluids. Monitor inflammatory marker, ferritin, D-dimer, LDH, fibrinogen, INR, LFTs, CK and CRP. On IV remdesivir. Continue with Decadron may switch to IV if not able to take orally. Received convalescent plasma 10/20/2020. Continue airborne and droplet isolation. Continue DVT prophylaxis with Lovenox. Discussed with nursing staff, treatment plan discussed with  Discussed with respiratory therapist.  Discussed with infectious disease and primary service. It was my pleasure to evaluate Adriana Faustin today. Please call with questions. Sita Mejia MD             10/21/2020, 1:26 PM    Please note that this chart was generated using voice recognition Dragon dictation software. Although every effort was made to ensure the accuracy of this automated transcription, some errors in transcription may have occurred.

## 2020-10-21 NOTE — PLAN OF CARE
Nutrition Problem #1: Predicted inadequate energy intake  Intervention: Food and/or Nutrient Delivery: Continue Current Diet, Start Oral Nutrition Supplement  Nutritional Goals: Pt to conusme >75% of est'd daily needs via PO

## 2020-10-21 NOTE — PROGRESS NOTES
Comprehensive Nutrition Assessment    Type and Reason for Visit:  Initial, Positive Nutrition Screen(Wt loss, poor appetite)    Nutrition Recommendations/Plan:   -Continue general diet   -Recommend ensure enlive supplements BID   -Will monitor po intake and weights     Nutrition Assessment:   Pt transferred from [facility] for Acute respiratory failure with hypoxia 2/2 +COVID-19. Pt currently sleeping w/ BiPaP on- innapropriate time to interview pt. No wt hx in EMR. Pt consumed % of his b-fast yesterday. Will add nutritional supplements to compliment po intake and will monitor wt trends. Malnutrition Assessment:  Malnutrition Status:  Insufficient data    Context:  Acute Illness     Findings of the 6 clinical characteristics of malnutrition:  Energy Intake:  No significant decrease in energy intake(x 1 day)  Weight Loss:  Unable to assess     Body Fat Loss:  Unable to assess     Muscle Mass Loss:  Unable to assess    Fluid Accumulation:  1 - Mild Extremities   Strength:  Not Performed    Estimated Daily Nutrient Needs:  Energy (kcal):  1.2-1.3 ~>3718-6933 kcals/d; Weight Used for Energy Requirements:  Admission     Protein (g):  1.2-1.3 gm/kg ~>87-94 gms/d; Weight Used for Protein Requirements:  Ideal          Nutrition Related Findings:  labs/meds reviewed      Wounds:  None       Current Nutrition Therapies:    DIET GENERAL; Anthropometric Measures:  · Height: 5' 9\" (175.3 cm)  · Current Body Weight: 227 lb (103 kg)   · Admission Body Weight: 227 lb (103 kg)    · Ideal Body Weight: 160 lbs; % Ideal Body Weight 141.9 %   · BMI: 33.5  · BMI Categories: Obese Class 1 (BMI 30.0-34. 9)       Nutrition Diagnosis:   · Predicted inadequate energy intake related to cardiac dysfunction(SOB) as evidenced by (pt reports poor appeite, wt loss;  Need for ONS)      Nutrition Interventions:   Food and/or Nutrient Delivery:  Continue Current Diet, Start Oral Nutrition Supplement  Nutrition Education/Counseling: Education not indicated   Coordination of Nutrition Care:  Continued Inpatient Monitoring    Goals: Set  Pt to conusme >75% of est'd daily needs via PO       Nutrition Monitoring and Evaluation:   Food/Nutrient Intake Outcomes:  Food and Nutrient Intake, Supplement Intake  Physical Signs/Symptoms Outcomes:  Biochemical Data, Nutrition Focused Physical Findings, Skin, Weight, GI Status, Fluid Status or Edema     Discharge Planning:     Too soon to determine     Electronically signed by Kari Diaz RD, LD on 10/21/20 at 12:12 PM EDT    Contact: [phone]

## 2020-10-21 NOTE — PROGRESS NOTES
[facility] -   Infectious diseases evaluation  admission date 10/20/2020    reason for consultation:   covid    Impression :   Current:  · covid diffuse illness  X 10/9/20  · Diffuse covid pneumonia   · Sepsis from covid  · lymphopenia  · elevated d-dimer 800  · transaminitis from covid    Other:  ·   Discussion / summary of stay / plan of care   ·   Recommendations   · Remdesevir x 5 days till 10/24  · Watch LFT  · Steroids and lovenox started 10/19  · Watch inflamm markers  · 1 Unit of plasma - pt consented 10/20/20, completed 10/20 5:30 PM  · Asking the research nurses to consider nitric oxide or any other study  Patient comforted    Infection Control Recommendations   · [place] Precautions  · Contact Isolation   · Airborne isolation  · Droplet Isolation      Antimicrobial Stewardship Recommendations   · Simplification of therapy  · Targeted therapy    Coordination ofOutpatient Care:   · Estimated Length of IV antimicrobials:  · Patient will need Midline / picc Catheter Insertion:   · Patient will need SNF:  · Patient will need outpatient wound care:     History of Present Illness:   Initial history:  Rena Ni is a 60y.o.-year-old male w cough and sob x 10/9 and tested + covid 10/12- got worse and more SOB and went to [facility] hospital 10/19 w diffuse covid . pniu and needing O2, D-dimer 820, transferred to us this am, liver enz elevated and lymphopenia, lost wet x ;ast week due to diarrhea, poor appetite till today when he started eating. Fever and dry cough, SOB and diarrhea, no rash, were his symptoms  No sore throat and no poor smell of the food.   Liver enz     Interval changes  10/21/2020   Examined in the room, patient scared because this morning he started noticing tachypnea shortness of breath increase, saturation went down to 24, need to be on high flow 15 L, saturating well much better, comfortable at this time,    Received the immune plasma at 5/30/2010/20, tolerated it well  Repeat chest x-ray today shows subtle changes of nonspecific pneumonia left lower lobe with persistent perihilar right opacities. Inflammatory markers still elevated  Tolerating remdesivir, liver enzymes are better today    His diarrhea seems to be better but he is on Imodium  His fever is better but he is on steroids    Summary of relevant labs:  Labs:  WBC 11-12  ALC 0.63    [facility]        Creat normal  D-Dimer 820  Fibrinogen 620    Micro:  [facility] 10/12  Imaging:  CXR 10/21  Subtle left lung opacities are nonspecific but could represent pneumonia.         Right infrahilar opacity may represent atelectasis versus pneumonia         CT chest 10/19 multifocal ground glass covid like pneumonia       I have personally reviewed the past medical history, past surgical history, medications, social history, and family history, and I haveupdated the database accordingly.   Past Medical History:     Past Medical History:   Diagnosis Date    Hypertension        Past Surgical  History:     Past Surgical History:   Procedure Laterality Date    LITHOTRIPSY      x4 all together       Medications:      dexamethasone  6 mg Oral Daily    sodium chloride flush  10 mL Intravenous 2 times per day    azithromycin  500 mg Intravenous Q24H    And    cefTRIAXone (ROCEPHIN) IV  1 g Intravenous Q24H    influenza virus vaccine  0.5 mL Intramuscular Once    enoxaparin  40 mg Subcutaneous BID    remdesivir IVPB  100 mg Intravenous Q24H       Social History:     Social History     Socioeconomic History    Marital status: Unknown     Spouse name: Not on file    Number of children: Not on file    Years of education: Not on file    Highest education level: Not on file   Occupational History    Not on file   Social Needs    Financial resource strain: Not on file    Food insecurity     Worry: Not on file     Inability: Not on file   [employer] needs Medical: Not on file     Non-medical: Not on file   Tobacco Use    Smoking status: Former Smoker     Years: 20.00    Smokeless tobacco: Never Used   Substance and Sexual Activity    Alcohol use: Never     Frequency: Never    Drug use: Never    Sexual activity: Not on file   Lifestyle    Physical activity     Days per week: Not on file     Minutes per session: Not on file    Stress: Not on file   Relationships    Social connections     Talks on phone: Not on file     Gets together: Not on file     Attends Buddhist service: Not on file     Active member of club or organization: Not on file     Attends meetings of clubs or organizations: Not on file     Relationship status: Not on file    Intimate partner violence     Fear of current or ex partner: Not on file     Emotionally abused: Not on file     Physically abused: Not on file     Forced sexual activity: Not on file   Other Topics Concern    Not on file   Social History Narrative    Not on file       Family History:   History reviewed. No pertinent family history. Allergies:   No known allergies     Review of Systems:     Review of Systems   Constitutional: Positive for fatigue. Negative for activity change, appetite change and fever. HENT: Negative for congestion. Eyes: Negative for itching. Respiratory: Positive for cough and shortness of breath. Negative for chest tightness and wheezing. Cardiovascular: Negative for chest pain. Gastrointestinal: Positive for diarrhea. Negative for anal bleeding. Endocrine: Negative for heat intolerance. Genitourinary: Negative for dysuria. Musculoskeletal: Negative for arthralgias. Skin: Negative for color change. Allergic/Immunologic: Negative for immunocompromised state. Neurological: Negative for facial asymmetry. Hematological: Negative for adenopathy. Psychiatric/Behavioral: Negative for agitation.        Physical Examination :     Patient Vitals for the past 8 hrs:   BP Temp Temp src Pulse Resp SpO2 Weight   10/21/20 1000 (!) 148/68 -- -- 76 30 93 % --   10/21/20 0900 (!) 159/66 -- -- 78 27 93 % --   10/21/20 0800 137/70 98 °F (36.7 °C) Axillary 81 (!) 33 95 % --   10/21/20 0700 136/70 -- -- 76 (!) 34 93 % --   10/21/20 0600 (!) 148/74 -- -- 79 (!) 34 (!) 87 % 227 lb (103 kg)   10/21/20 0500 -- -- -- 71 27 96 % --   10/21/20 0400 -- -- -- 74 (!) 35 98 % --   10/21/20 0300 116/65 97.6 °F (36.4 °C) Oral 68 25 90 % --       Physical Exam  Constitutional:       General: He is not in acute distress. Appearance: He is normal weight. HENT:      Head: Normocephalic and atraumatic. Nose: No congestion. Mouth/Throat:      Mouth: Mucous membranes are moist.   Eyes:      General: No scleral icterus. Conjunctiva/sclera: Conjunctivae normal.      Pupils: Pupils are equal, round, and reactive to light. Neck:      Musculoskeletal: Neck supple. No neck rigidity. Cardiovascular:      Rate and Rhythm: Normal rate and regular rhythm. Heart sounds: Normal heart sounds. No murmur. Pulmonary:      Effort: No respiratory distress. Breath sounds: Rales present. Abdominal:      General: There is no distension. Palpations: Abdomen is soft. Tenderness: There is no abdominal tenderness. Genitourinary:     Comments: No sands  Musculoskeletal:         General: No swelling or deformity. Right lower leg: No edema. Left lower leg: No edema. Skin:     General: Skin is dry. Coloration: Skin is not jaundiced. Neurological:      General: No focal deficit present. Mental Status: He is alert and oriented to person, place, and time. Psychiatric:         Mood and Affect: Mood normal.         Thought Content:  Thought content normal.           Medical Decision Making:   I have independently reviewed/ordered the following labs:    CBC with Differential:   Recent Labs     10/20/20  0443 10/21/20  0819   WBC 11.1* 12.6*   HGB 14.1 14.4   HCT 42.1 43.8    427   LYMPHOPCT 11* 8*   MONOPCT  --  5     BMP:  Recent Labs     10/20/20  0443 10/20/20  0443 10/21/20  0819     --  142   K 4.5  --  4.0     --  106   CO2 22  --  24   BUN 17  --  17   CREATININE 0.72  --  0.67*   MG  --  2.0  --      Hepatic Function Panel:   Recent Labs     10/20/20  0443 10/21/20  0819   PROT 6.1* 6.6   LABALBU 3.2* 3.0*   BILITOT 1.0 0.47   ALKPHOS 62 66   * 159*   * 96*     No results for input(s): RPR in the last 72 hours. No results for input(s): HIV in the last 72 hours. No results for input(s): BC in the last 72 hours. Lab Results   Component Value Date    CREATININE 0.67 10/21/2020    CREATININE 0.72 10/20/2020    GLUCOSE 149 10/21/2020    GLUCOSE 173 10/20/2020       Detailed results: Thank you for allowing us to participate in the care of this patient. Please call with questions. This note is created with the assistance of a speech recognition program.  While intending to generate adocument that actually reflects the content of the visit, the document can still have some errors including those of syntax and sound a like substitutions which may escape proof reading. It such instances, actual meaningcan be extrapolated by contextual diversion.     Fiona Sandhu MD  Office: [phone]  Perfect serve / office [phone]

## 2020-10-21 NOTE — PLAN OF CARE
NON INVASIVE VENTILATION  PROVIDE OPTIMAL VENTILATION/ACCEPTABLE SP02  IMPLEMENT NON INVASIVE VENTILATION PROTOCOL  ASSESSMENT SKIN INTEGRITY  PATIENT EDUCATION AS NEEDED  BIPAP AS NEEDED-Pt wearing and resting comfortably

## 2020-10-22 LAB
ABSOLUTE EOS #: 0 K/UL (ref 0–0.4)
ABSOLUTE IMMATURE GRANULOCYTE: 0 K/UL (ref 0–0.3)
ABSOLUTE LYMPH #: 1.51 K/UL (ref 1–4.8)
ABSOLUTE MONO #: 0.63 K/UL (ref 0.1–0.8)
ALBUMIN SERPL-MCNC: 2.8 G/DL (ref 3.5–5.2)
ALBUMIN/GLOBULIN RATIO: 0.8 (ref 1–2.5)
ALP BLD-CCNC: 66 U/L (ref 40–129)
ALT SERPL-CCNC: 155 U/L (ref 5–41)
ANION GAP SERPL CALCULATED.3IONS-SCNC: 12 MMOL/L (ref 9–17)
AST SERPL-CCNC: 74 U/L
BASOPHILS # BLD: 0 % (ref 0–2)
BASOPHILS ABSOLUTE: 0 K/UL (ref 0–0.2)
BILIRUB SERPL-MCNC: 0.5 MG/DL (ref 0.3–1.2)
BNP INTERPRETATION: NORMAL
BUN BLDV-MCNC: 23 MG/DL (ref 6–20)
BUN/CREAT BLD: ABNORMAL (ref 9–20)
C-REACTIVE PROTEIN: 38.5 MG/L (ref 0–5)
CALCIUM SERPL-MCNC: 8.5 MG/DL (ref 8.6–10.4)
CHLORIDE BLD-SCNC: 105 MMOL/L (ref 98–107)
CO2: 21 MMOL/L (ref 20–31)
CREAT SERPL-MCNC: 0.78 MG/DL (ref 0.7–1.2)
D-DIMER QUANTITATIVE: 0.59 MG/L FEU
DIFFERENTIAL TYPE: ABNORMAL
DIRECT EXAM: NORMAL
EOSINOPHILS RELATIVE PERCENT: 0 % (ref 1–4)
FERRITIN: 1784 UG/L (ref 30–400)
FIBRINOGEN: 589 MG/DL (ref 140–420)
GFR AFRICAN AMERICAN: >60 ML/MIN
GFR NON-AFRICAN AMERICAN: >60 ML/MIN
GFR SERPL CREATININE-BSD FRML MDRD: ABNORMAL ML/MIN/{1.73_M2}
GFR SERPL CREATININE-BSD FRML MDRD: ABNORMAL ML/MIN/{1.73_M2}
GLUCOSE BLD-MCNC: 124 MG/DL (ref 75–110)
GLUCOSE BLD-MCNC: 161 MG/DL (ref 70–99)
HCT VFR BLD CALC: 43.4 % (ref 40.7–50.3)
HEMOGLOBIN: 13.9 G/DL (ref 13–17)
IMMATURE GRANULOCYTES: 0 %
INR BLD: 1
LACTATE DEHYDROGENASE: 783 U/L (ref 135–225)
LYMPHOCYTES # BLD: 12 % (ref 24–44)
Lab: NORMAL
MCH RBC QN AUTO: 30.9 PG (ref 25.2–33.5)
MCHC RBC AUTO-ENTMCNC: 32 G/DL (ref 28.4–34.8)
MCV RBC AUTO: 96.4 FL (ref 82.6–102.9)
MONOCYTES # BLD: 5 % (ref 1–7)
MORPHOLOGY: NORMAL
NRBC AUTOMATED: 0 PER 100 WBC
PARTIAL THROMBOPLASTIN TIME: 19.3 SEC (ref 20.5–30.5)
PDW BLD-RTO: 12.5 % (ref 11.8–14.4)
PHOSPHORUS: 4.4 MG/DL (ref 2.5–4.5)
PLATELET # BLD: 488 K/UL (ref 138–453)
PLATELET ESTIMATE: ABNORMAL
PMV BLD AUTO: 8.7 FL (ref 8.1–13.5)
POTASSIUM SERPL-SCNC: 4.6 MMOL/L (ref 3.7–5.3)
PRO-BNP: 78 PG/ML
PROTHROMBIN TIME: 10 SEC (ref 9–12)
RBC # BLD: 4.5 M/UL (ref 4.21–5.77)
RBC # BLD: ABNORMAL 10*6/UL
SEG NEUTROPHILS: 83 % (ref 36–66)
SEGMENTED NEUTROPHILS ABSOLUTE COUNT: 10.46 K/UL (ref 1.8–7.7)
SODIUM BLD-SCNC: 138 MMOL/L (ref 135–144)
SPECIMEN DESCRIPTION: NORMAL
TOTAL CK: 100 U/L (ref 39–308)
TOTAL PROTEIN: 6.5 G/DL (ref 6.4–8.3)
TROPONIN INTERP: NORMAL
TROPONIN T: NORMAL NG/ML
TROPONIN, HIGH SENSITIVITY: <6 NG/L (ref 0–22)
URIC ACID: 6 MG/DL (ref 3.4–7)
WBC # BLD: 12.6 K/UL (ref 3.5–11.3)
WBC # BLD: ABNORMAL 10*3/UL

## 2020-10-22 PROCEDURE — 83615 LACTATE (LD) (LDH) ENZYME: CPT

## 2020-10-22 PROCEDURE — 82550 ASSAY OF CK (CPK): CPT

## 2020-10-22 PROCEDURE — 36415 COLL VENOUS BLD VENIPUNCTURE: CPT

## 2020-10-22 PROCEDURE — 82947 ASSAY GLUCOSE BLOOD QUANT: CPT

## 2020-10-22 PROCEDURE — 2000000000 HC ICU R&B

## 2020-10-22 PROCEDURE — 2580000003 HC RX 258: Performed by: NURSE PRACTITIONER

## 2020-10-22 PROCEDURE — 85730 THROMBOPLASTIN TIME PARTIAL: CPT

## 2020-10-22 PROCEDURE — 6360000002 HC RX W HCPCS: Performed by: INTERNAL MEDICINE

## 2020-10-22 PROCEDURE — 83880 ASSAY OF NATRIURETIC PEPTIDE: CPT

## 2020-10-22 PROCEDURE — 6360000002 HC RX W HCPCS: Performed by: NURSE PRACTITIONER

## 2020-10-22 PROCEDURE — 2500000003 HC RX 250 WO HCPCS: Performed by: INTERNAL MEDICINE

## 2020-10-22 PROCEDURE — 82728 ASSAY OF FERRITIN: CPT

## 2020-10-22 PROCEDURE — 2700000000 HC OXYGEN THERAPY PER DAY

## 2020-10-22 PROCEDURE — 99233 SBSQ HOSP IP/OBS HIGH 50: CPT | Performed by: INTERNAL MEDICINE

## 2020-10-22 PROCEDURE — 84484 ASSAY OF TROPONIN QUANT: CPT

## 2020-10-22 PROCEDURE — 6370000000 HC RX 637 (ALT 250 FOR IP): Performed by: NURSE PRACTITIONER

## 2020-10-22 PROCEDURE — 93005 ELECTROCARDIOGRAM TRACING: CPT | Performed by: INTERNAL MEDICINE

## 2020-10-22 PROCEDURE — 97535 SELF CARE MNGMENT TRAINING: CPT

## 2020-10-22 PROCEDURE — 85379 FIBRIN DEGRADATION QUANT: CPT

## 2020-10-22 PROCEDURE — 99291 CRITICAL CARE FIRST HOUR: CPT | Performed by: INTERNAL MEDICINE

## 2020-10-22 PROCEDURE — 84100 ASSAY OF PHOSPHORUS: CPT

## 2020-10-22 PROCEDURE — 85610 PROTHROMBIN TIME: CPT

## 2020-10-22 PROCEDURE — 84550 ASSAY OF BLOOD/URIC ACID: CPT

## 2020-10-22 PROCEDURE — 99232 SBSQ HOSP IP/OBS MODERATE 35: CPT | Performed by: INTERNAL MEDICINE

## 2020-10-22 PROCEDURE — 80053 COMPREHEN METABOLIC PANEL: CPT

## 2020-10-22 PROCEDURE — 85384 FIBRINOGEN ACTIVITY: CPT

## 2020-10-22 PROCEDURE — 94761 N-INVAS EAR/PLS OXIMETRY MLT: CPT

## 2020-10-22 PROCEDURE — 2580000003 HC RX 258: Performed by: INTERNAL MEDICINE

## 2020-10-22 PROCEDURE — 97165 OT EVAL LOW COMPLEX 30 MIN: CPT

## 2020-10-22 PROCEDURE — 87804 INFLUENZA ASSAY W/OPTIC: CPT

## 2020-10-22 PROCEDURE — 85025 COMPLETE CBC W/AUTO DIFF WBC: CPT

## 2020-10-22 PROCEDURE — 86140 C-REACTIVE PROTEIN: CPT

## 2020-10-22 RX ORDER — SODIUM CHLORIDE 0.9 % (FLUSH) 0.9 %
20 SYRINGE (ML) INJECTION ONCE
Status: COMPLETED | OUTPATIENT
Start: 2020-10-22 | End: 2020-10-22

## 2020-10-22 RX ORDER — SODIUM CHLORIDE 9 MG/ML
20 INJECTION, SOLUTION INTRAVENOUS ONCE
Status: COMPLETED | OUTPATIENT
Start: 2020-10-22 | End: 2020-10-22

## 2020-10-22 RX ADMIN — ENOXAPARIN SODIUM 40 MG: 40 INJECTION SUBCUTANEOUS at 08:56

## 2020-10-22 RX ADMIN — SODIUM CHLORIDE 20 ML: 9 INJECTION, SOLUTION INTRAVENOUS at 14:58

## 2020-10-22 RX ADMIN — REMDESIVIR 100 MG: 100 INJECTION, POWDER, LYOPHILIZED, FOR SOLUTION INTRAVENOUS at 16:13

## 2020-10-22 RX ADMIN — SODIUM CHLORIDE, PRESERVATIVE FREE 10 ML: 5 INJECTION INTRAVENOUS at 19:57

## 2020-10-22 RX ADMIN — SODIUM CHLORIDE, PRESERVATIVE FREE 10 ML: 5 INJECTION INTRAVENOUS at 08:57

## 2020-10-22 RX ADMIN — DEXAMETHASONE 6 MG: 4 TABLET ORAL at 19:40

## 2020-10-22 RX ADMIN — ENOXAPARIN SODIUM 40 MG: 40 INJECTION SUBCUTANEOUS at 21:04

## 2020-10-22 RX ADMIN — Medication 50 ML: at 14:13

## 2020-10-22 RX ADMIN — CEFTRIAXONE SODIUM 1 G: 1 INJECTION, POWDER, FOR SOLUTION INTRAMUSCULAR; INTRAVENOUS at 19:56

## 2020-10-22 RX ADMIN — Medication 500 MG: at 19:43

## 2020-10-22 RX ADMIN — Medication 20 ML: at 14:12

## 2020-10-22 ASSESSMENT — PAIN SCALES - GENERAL
PAINLEVEL_OUTOF10: 0

## 2020-10-22 ASSESSMENT — ENCOUNTER SYMPTOMS
COLOR CHANGE: 0
CHEST TIGHTNESS: 0
ANAL BLEEDING: 0
EYE ITCHING: 0
SHORTNESS OF BREATH: 0
WHEEZING: 0
DIARRHEA: 1
COUGH: 1

## 2020-10-22 NOTE — PLAN OF CARE
PROVIDE ADEQUATE OXYGENATION WITH ACCEPTABLE SP02/ABG'S    [x]  IDENTIFY APPROPRIATE OXYGEN THERAPY  [x]   MONITOR SP02/ABG'S AS NEEDED   [x]   PATIENT EDUCATION AS NEEDED     NONINVASIVE VENTILATION    PROVIDE OPTIMAL VENTILATION/ACCEPTABLE SPO2   IMPLEMENT NONINVASIVE VENTILATION PROTOCOL   MAINTAIN ACCEPTABLE SPO2   ASSESS SKIN INTEGRITY/BREAKDOWN SCORE   PATIENT EDUCATION AS NEEDED   BIPAP AS NEEDED

## 2020-10-22 NOTE — CARE COORDINATION
TRANSITIONAL CARE PLANNING/ 2 [facility] Day: 2    Reason for Admission: Pneumonia due to COVID-19 virus [U07.1, J12.89]     Treatment Plan of Care: hi jose 70%      Readmission Risk              Risk of Unplanned Readmission:        20            Patient goals/Treatment Preferences/Transitional Plan: return home independent     Follow up needed: home o2 needs

## 2020-10-22 NOTE — PROGRESS NOTES
[facility] -   Infectious diseases evaluation  admission date 10/20/2020    reason for consultation:   covid    Impression :   Current:  · covid diffuse illness  X 10/9/20  · Diffuse covid pneumonia   · Sepsis from covid  · lymphopenia  · elevated d-dimer 800  · transaminitis from covid    Other:  ·   Discussion / summary of stay / plan of care   ·   Recommendations   · Remdesevir x 5 days till 10/24  · Watch LFT  · Steroids and lovenox started 10/19  · Watch inflamm markers  · 1 Unit of plasma - pt consented 10/20/20, completed 10/20 5:30 PM  · Did not fit criteria for nitric oxide,  · Signed for the genetic study  · In general starting to stabilize  Patient comforted    Infection Control Recommendations   · [place] Precautions  · Contact Isolation   · Airborne isolation  · Droplet Isolation      Antimicrobial Stewardship Recommendations   · Simplification of therapy  · Targeted therapy    Coordination ofOutpatient Care:   · Estimated Length of IV antimicrobials:  · Patient will need Midline / picc Catheter Insertion:   · Patient will need SNF:  · Patient will need outpatient wound care:     History of Present Illness:   Initial history:  Karen Ghotra is a 60y.o.-year-old male w cough and sob x 10/9 and tested + covid 10/12- got worse and more SOB and went to [facility] hospital 10/19 w diffuse covid . pniu and needing O2, D-dimer 820, transferred to us this am, liver enz elevated and lymphopenia, lost wet x ;ast week due to diarrhea, poor appetite till today when he started eating. Fever and dry cough, SOB and diarrhea, no rash, were his symptoms  No sore throat and no poor smell of the food.   Liver enz     Interval changes  10/22/2020   Examined in the room, the patient feels much better, had a great night of sleep, he has been on high flow 60% oxygen and 33 L, he feels much better today and stronger,  Stools became more she, diarrhea seems to be resolving, no Negative for adenopathy. Psychiatric/Behavioral: Negative for agitation. Physical Examination :     Patient Vitals for the past 8 hrs:   BP Temp Temp src Pulse Resp SpO2 Weight   10/22/20 0900 122/73 -- -- 85 24 91 % --   10/22/20 0825 -- -- -- -- 25 94 % --   10/22/20 0800 134/60 98.4 °F (36.9 °C) Oral 75 28 93 % --   10/22/20 0700 (!) 142/54 -- -- 71 30 94 % --   10/22/20 0600 (!) 129/43 -- -- 66 24 97 % --   10/22/20 0500 105/68 98.1 °F (36.7 °C) Oral 79 26 93 % 224 lb 13.9 oz (102 kg)   10/22/20 0400 (!) 115/58 -- -- 60 22 97 % --   10/22/20 0330 -- -- -- -- 20 99 % --   10/22/20 0300 (!) 112/49 -- -- 64 22 97 % --   10/22/20 0200 (!) 142/69 -- -- 78 21 96 % --       Physical Exam  Constitutional:       General: He is not in acute distress. Appearance: He is normal weight. HENT:      Head: Normocephalic and atraumatic. Nose: No congestion. Mouth/Throat:      Mouth: Mucous membranes are moist.   Eyes:      General: No scleral icterus. Conjunctiva/sclera: Conjunctivae normal.      Pupils: Pupils are equal, round, and reactive to light. Neck:      Musculoskeletal: Neck supple. No neck rigidity. Cardiovascular:      Rate and Rhythm: Normal rate and regular rhythm. Heart sounds: Normal heart sounds. No murmur. Pulmonary:      Effort: No respiratory distress. Breath sounds: Rales present. Abdominal:      General: There is no distension. Palpations: Abdomen is soft. Tenderness: There is no abdominal tenderness. Genitourinary:     Comments: No sands  Musculoskeletal:         General: No swelling or deformity. Right lower leg: No edema. Left lower leg: No edema. Skin:     General: Skin is dry. Coloration: Skin is not jaundiced. Neurological:      General: No focal deficit present. Mental Status: He is alert and oriented to person, place, and time. Cranial Nerves: No cranial nerve deficit. Sensory: No sensory deficit. Psychiatric:         Mood and Affect: Mood normal.         Thought Content: Thought content normal.           Medical Decision Making:   I have independently reviewed/ordered the following labs:    CBC with Differential:   Recent Labs     10/21/20  0819 10/22/20  0604   WBC 12.6* 12.6*   HGB 14.4 13.9   HCT 43.8 43.4    488*   LYMPHOPCT 8* 12*   MONOPCT 5 5     BMP:  Recent Labs     10/20/20  0443 10/21/20  0819 10/22/20  0604   NA  --  142 138   K  --  4.0 4.6   CL  --  106 105   CO2  --  24 21   BUN  --  17 23*   CREATININE  --  0.67* 0.78   MG 2.0  --   --      Hepatic Function Panel:   Recent Labs     10/21/20  0819 10/22/20  0604   PROT 6.6 6.5   LABALBU 3.0* 2.8*   BILITOT 0.47 0.50   ALKPHOS 66 66   * 155*   AST 96* 74*     No results for input(s): RPR in the last 72 hours. No results for input(s): HIV in the last 72 hours. No results for input(s): BC in the last 72 hours. Lab Results   Component Value Date    CREATININE 0.78 10/22/2020    CREATININE 0.72 10/20/2020    GLUCOSE 161 10/22/2020    GLUCOSE 173 10/20/2020       Detailed results: Thank you for allowing us to participate in the care of this patient. Please call with questions. This note is created with the assistance of a speech recognition program.  While intending to generate adocument that actually reflects the content of the visit, the document can still have some errors including those of syntax and sound a like substitutions which may escape proof reading. It such instances, actual meaningcan be extrapolated by contextual diversion.     Mercedes Trejo MD  Office: [phone]  Perfect serve / office [phone]

## 2020-10-22 NOTE — PROGRESS NOTES
[facility]  Office: [address], DO, Chaya Buckley, DO, Emmanuelle Kingston, DO, Georgina Ibarra, DO, Parisa Ordoñez MD, Emmaneul Vuong MD, Genesis Jamison MD, Mary Patrick MD, Laine Tenorio MD, Keysha Acevedo MD, Domi Mejia MD, Dai Ko MD, Ranjit Roth MD, Thad Pedersen DO, Emily Dumont MD, Frantz Fisher MD, Micah Virgen DO, Marcial Wallace MD,  Kim Pastrana, DO, Erika Dennis MD, Len Bagley MD, Simone Degroot, [facility], [facility], [facility], Amanda Sanders, CNP, Yoselyn Delgado, CNS, Aaron Orellana, CNP, Carlin Velazquez, CNP, Anastasia Peres, CNP, Dionicia Frankel, CNP, Nichole Perry, CNP, JUAN J BelleC, Holden Joyner, [facility], Jefe Bonilla, CNP, Michael Pineda, CNP, Prasanna Leary, CNP, Buzz Beth, CNP, Pradip Cornejo, [facility]   [address] [facility]    Progress Note    10/22/2020    3:01 PM    Name:   Mery Olson  MRN:     [id number]     Acct:      [de-identified]   Room:   [address] [place] [address] Day:  2  Admit Date:  10/20/2020 11:14 AM    PCP:   Sisi Moses MD  Code Status:  Full Code    Subjective:     C/C: No chief complaint on file. SOB  Interval History Status: worsened.      Seen and examined face-to-face,  Discussed with pulmonary,  Infectious disease on board,  Shortness of breath much better as compared to yesterday,  On high flow oxygen at the rate of 60% FiO2    Brief History:   A 61-year-old gentleman with underlying history of hypertension presented with increased shortness of breath which started few days back, ended up into the ER with increasing need of oxygen, his shortness of breath and cough started on October 9, tested positive for Covid on October 12 subsequently got worse and more short of breath and went to the local ER on October 19 with diffuse Covid related pneumonia, needing oxygen, D-dimer was 820, CT of the chest did not show any PE but bilateral groundglass multifocal pneumonia consistent with Covid 19 pneumonia.,  Transferred to [facility] for further management,  Has already been seen by infectious disease and started on remdesivir, patient already started feeling better    October 22,  Acute respiratory failure,  Sepsis due to Covid pneumonia  Covid pneumonia,  Hypertension,  Obesity,  History of smoking,  Shortness of breath that has been improving today,  Patient on remdesivir, already got the plasma, Decadron      Review of Systems:     Constitutional:  negative for chills, fevers, sweats  Respiratory: Shortness of breath  Cardiovascular:  negative for chest pain, chest pressure/discomfort, lower extremity edema, palpitations  Gastrointestinal:  negative for abdominal pain, constipation, diarrhea, nausea, vomiting  Neurological:  negative for dizziness, headache    Medications: Allergies: Allergies   Allergen Reactions    No Known Allergies        Current Meds:   Scheduled Meds:    dexamethasone  6 mg Oral Daily    sodium chloride flush  10 mL Intravenous 2 times per day    azithromycin  500 mg Intravenous Q24H    And    cefTRIAXone (ROCEPHIN) IV  1 g Intravenous Q24H    influenza virus vaccine  0.5 mL Intramuscular Once    enoxaparin  40 mg Subcutaneous BID    remdesivir IVPB  100 mg Intravenous Q24H     Continuous Infusions:   PRN Meds: loperamide, acetaminophen **OR** acetaminophen, acetaminophen **OR** acetaminophen, albuterol, magnesium hydroxide, nicotine, promethazine **OR** ondansetron, sodium chloride flush, sodium chloride, zolpidem, polyvinyl alcohol    Data:     Past Medical History:   has a past medical history of Hypertension and Patient in clinical research study. Social History:   reports that he has quit smoking. He quit after 20.00 years of use. He has never used smokeless tobacco. He reports that he does not drink alcohol or use drugs. Family History: History reviewed. No pertinent family history.     Vitals:  BP (!) 142/58   Pulse 98   Temp 98.7 °F (37.1 °C) (Oral)   Resp 27   Ht 5' 9\" (1.753 m)   Wt 224 lb 13.9 oz (102 kg)   SpO2 95%   BMI 33.21 kg/m²   Temp (24hrs), Av.5 °F (36.9 °C), Min:98.1 °F (36.7 °C), Max:98.8 °F (37.1 °C)    Recent Labs     10/21/20  1139 10/21/20  1748 10/22/20  0854   POCGLU 122* 112* 124*       I/O (24Hr):     Intake/Output Summary (Last 24 hours) at 10/22/2020 1501  Last data filed at 10/22/2020 1349  Gross per 24 hour   Intake 1191 ml   Output 1550 ml   Net -359 ml       Labs:  Hematology:  Recent Labs     10/20/20  0443 10/21/20  0819 10/22/20  0604   WBC 11.1* 12.6* 12.6*   RBC 4.50* 4.67 4.50   HGB 14.1 14.4 13.9   HCT 42.1 43.8 43.4   MCV 94 93.8 96.4   MCH 31.3 30.8 30.9   MCHC 33.5 32.9 32.0   RDW  --  12.7 12.5    427 488*   MPV 8.7* 8.4 8.7   CRP  --   --  38.5*   INR  --  0.9 1.0   DDIMER  --  0.46 0.59     Chemistry:  Recent Labs     10/20/20  0443 10/20/20  0443 10/21/20  0819 10/22/20  0604     --  142 138   K 4.5  --  4.0 4.6     --  106 105   CO2 22  --  24 21   GLUCOSE 173*  --  149* 161*   BUN 17  --  17 23*   CREATININE 0.72  --  0.67* 0.78   MG  --  2.0  --   --    ANIONGAP  --   --  12 12   LABGLOM  --   --  >60 >60   GFRAA  --   --  >60 >60   CALCIUM 7.9*  --  8.9 8.5*   PHOS  --   --   --  4.4   PROBNP  --   --   --  78   TROPHS  --   --   --  <6   CKTOTAL  --   --   --  100     Recent Labs     10/20/20  0443 10/21/20  0819 10/21/20  1139 10/21/20  1748 10/22/20  0604 10/22/20  0854   PROT 6.1* 6.6  --   --  6.5  --    LABALBU 3.2* 3.0*  --   --  2.8*  --    * 96*  --   --  74*  --    * 159*  --   --  155*  --    LDH  --   --   --   --  783*  --    ALKPHOS 62 66  --   --  66  --    BILITOT 1.0 0.47  --   --  0.50  --    URICACID  --   --   --   --  6.0  --    POCGLU  --   --  122* 112*  --  124*     ABG:No results found for: POCPH, PHART, PH, POCPCO2, GJQ0ZOQ, PCO2, POCPO2, PO2ART, PO2, POCHCO3, IBQ2YMO, HCO3, NBEA, PBEA, BEART, BE, THGBART, THB, [id number], HDJI7UXP, B4TOBXSZ, O2SAT, FIO2  Lab Results   Component Value Date/Time    SPECIAL NOT REPORTED 10/22/2020 12:00 PM     No results found for: CULTURE    Radiology:  Xr Chest (single View Frontal)    Result Date: 10/21/2020  Subtle left lung opacities are nonspecific but could represent pneumonia. Right infrahilar opacity may represent atelectasis versus pneumonia. Cta Chest W Contrast    Result Date: 10/19/2020  1. Study degraded by motion. 2. No evidence of central PE or secondary signs of PE. 3. Multilobar groundglass opacities in a peripheral distribution raises suspicion of multilobar pneumonia, potentially of viral etiology. Physical Examination:        General appearance:  alert, cooperative and no distress  Mental Status:  oriented to person, place and time and normal affect  Lungs: Bilateral coarse breath sounds, basal crackles  Heart:  regular rate and rhythm, no murmur  Abdomen:  soft, nontender, nondistended, normal bowel sounds, no masses, hepatomegaly, splenomegaly  Extremities:  no edema, redness, tenderness in the calves  Skin:  no gross lesions, rashes, induration    Assessment:        Hospital Problems           Last Modified POA    * (Principal) Acute respiratory failure with hypoxia ([address] 10/20/2020 Yes    HTN (hypertension), benign 10/20/2020 Yes    Pneumonia due to COVID-19 virus 10/20/2020 Yes    Sepsis due to COVID-19 ([address] 10/20/2020 Yes    Primary insomnia 10/20/2020 Yes          Plan:        1. Covid pneumonia, acute respiratory failure, sepsis, infectious disease on board, pulmonary on board, on high flow oxygen with FiO2 60%, 16 /8 settings,  2. ID started the patient on remdesivir, convalescent plasma given, Decadron,  3. Monitor labs,  4. Ambien for insomnia,  5. DVT prophylaxis,  6.  Full CODE STATUS    Colby Bruce MD  10/22/2020  3:01 PM

## 2020-10-22 NOTE — PROGRESS NOTES
PULMONARY/CRITICAL CARE PROGRESS NOTE:  Patient's name:  Ashanti Palmer  Medical Record Number: [id number]  Patient's account/billing number: [de-identified]  Patient's YOB: 1965  Age: 60 y.o. Date of Admission: 10/20/2020 11:14 AM    I personally interviewed/examined the patient, reviewed interval history and interpreted all available radiographic, laboratory data at the time of service. REASON FOR ADMISSION:   COVID-19 pneumonia  Acute hypoxic respiratory failure    LOS: 2    ICU COURSE/INTERVAL HISTORY: 10/22/2020    Overnight events were reported and seen. He remained hemodynamically stable no hypotension was reported  He did not have fever spike and temperature was 98.7. Yesterday afternoon he was switched from BiPAP to high flow nasal cannula and he was on 70% FiO2 and 30 L he was tried this morning on 60% and 25 L and his saturation was around 90%. He did not want to use BiPAP last night and remained on high flow  He is looking comfortable on high flow oxygen no severe worsening/hypoxic event or worsening distress reported overnight  He did sleep prone last night. He is on Decadron, remdesivir IV, received convalescent plasma x1. PT SEEN AND CHART REVIEWED. The patient is a 60 y.o. male who was transferred from [facility] on 10/20/2020. He was admitted to [facility] on 10/19/2020 when he presented with almost 5 to 6 days history of fever associated with cough gradually worsening shortness of breath, diarrhea with some abdominal cramping without vomiting, headache myalgias body aches and was admitted to ICU there initially he was on nasal cannula and was having increasing requirement of oxygen and was transferred to [place] on 10/19/2020. He initially went to clinic locally and his COVID-19 test was positive on 10/12/2020 per chart.   He was having fever while he was in [facility] and started empirically on antibiotic, on Decadron for COVID-19 for  myalgias, arthralgias, joint swelling, stiff joints, decreased range of motion, muscle weakness, and bone pain  NEUROLOGICAL: Positive for headaches, negative for dizziness, seizures, memory problems, speech problems, visual disturbance, coordination problems, gait problems, tremor, dysphagia, weakness, numbness, syncope, and tingling  BEHAVIOR/PSYCH: Positive for decreased energy level, decreased concentration, anxiety           Physical Exam:    VITAL SIGNS:  LAST:  BP (!) 126/56   Pulse 90   Temp 98.4 °F (36.9 °C) (Oral)   Resp 26   Ht 5' 9\" (1.753 m)   Wt 224 lb 13.9 oz (102 kg)   SpO2 93%   BMI 33.21 kg/m²   8-24 HR RANGE:  TEMP Temp  Av.5 °F (36.9 °C)  Min: 98.1 °F (36.7 °C)  Max: 98.8 °F (24.7 °C)   BP Systolic (53UQH), CNW:460 , Min:103 , TZK:407      Diastolic (05TSD), SHE:01, Min:43, Max:92     PULSE Pulse  Av.2  Min: 60  Max: 101   RR Resp  Av.9  Min: 20  Max: 30   O2 SAT SpO2  Av.7 %  Min: 91 %  Max: 99 %   OXYGEN DELIVERY O2 Flow Rate (L/min)  Av.2 L/min  Min: 25 L/min  Max: 30 L/min        INPUT/OUTPUT:  In: 951 [P.O.:600]  Out: 1350 [Urine:1350]       GENERAL APPEARANCE : alert and cooperative with exam  PHYSICAL EXAMINATION:   I have discussed the care of Ashanti Palmer, including pertinent history and exam findings,  with the nursing staff. I have seen  the patient and the key elements of all parts of the encounter have been performed by me. For careful stewardship of limited PPE during COVID-19 pandemic my physical exam was deferred. He was seen and evaluated from outside the room through the window     General appearance - oriented to person, place, and time, overweight and in no to mild distress.   Mental status - alert, oriented to person, place, and time  Chest -mild tachypnea present no retraction or cyanosis      Diet:  DIET GENERAL;  Dietary Nutrition Supplements: Standard High Calorie Oral Supplement    Medications:Current Inpatient    Scheduled Meds:   dexamethasone  6 mg Oral Daily    sodium chloride flush  10 mL Intravenous 2 times per day    azithromycin  500 mg Intravenous Q24H    And    cefTRIAXone (ROCEPHIN) IV  1 g Intravenous Q24H    influenza virus vaccine  0.5 mL Intramuscular Once    enoxaparin  40 mg Subcutaneous BID    remdesivir IVPB  100 mg Intravenous Q24H     Continuous Infusions:  PRN Meds:loperamide, acetaminophen **OR** acetaminophen, acetaminophen **OR** acetaminophen, albuterol, magnesium hydroxide, nicotine, promethazine **OR** ondansetron, sodium chloride flush, sodium chloride, zolpidem, polyvinyl alcohol  VITAL SIGNS:  LAST:  BP (!) 126/56   Pulse 90   Temp 98.4 °F (36.9 °C) (Oral)   Resp 26   Ht 5' 9\" (1.753 m)   Wt 224 lb 13.9 oz (102 kg)   SpO2 93%   BMI 33.21 kg/m²   8-24 HR RANGE:  TEMP Temp  Av.5 °F (36.9 °C)  Min: 98.1 °F (36.7 °C)  Max: 98.8 °F (30.1 °C)   BP Systolic (15FDH), ZSF:012 , Min:103 , FHY:336      Diastolic (86HPO), UEX:74, Min:43, Max:92     PULSE Pulse  Av.2  Min: 60  Max: 101   RR Resp  Av.9  Min: 20  Max: 30   O2 SAT SpO2  Av.7 %  Min: 91 %  Max: 99 %   OXYGEN DELIVERY O2 Flow Rate (L/min)  Av.2 L/min  Min: 25 L/min  Max: 30 L/min        VENTILATOR SETTINGS:  Vent Information  Skin Assessment: Clean, dry, & intact  FiO2 : 60 %  SpO2: 93 %  SpO2/FiO2 ratio: 155  I Time/ I Time %: 0.9 s  Humidification Source: Heated wire  Humidification Temp: 33  Humidification Temp Measured: 33  Mask Type: Full face mask  Mask Size: Medium     PaO2/FiO2 RATIO:  No results for input(s): POCPO2 in the last 72 hours. FiO2 : 60 %     INPUT/OUTPUT:  In: 951 [P.O.:600]  Out: 1350 [Urine:1350]     LABS:  ABGs:   No results for input(s): POCPH, POCPCO2, POCPO2, POCHCO3, RSYJ8QYP in the last 72 hours.   CBC:   Recent Labs     10/19/20  1221 10/20/20  0443 10/21/20  0819 10/22/20  0604   WBC 9.4 11.1* 12.6* 12.6*   HGB 14.5 14.1 14.4 13.9   HCT 41.9* 42.1 43.8 43.4   MCV 92 94 93.8 96.4   PLT continue with high flow nasal cannula.  Discussed with nursing staff to have BiPAP at night and intermittently during the daytime as needed or if distress.  We will try to alternate high flow nasal cannula with BiPAP.  Continue with prone positioning at night and also intermittently during the daytime.  Remain increased risk for intubation pain   Monitor oxygenation, work of breathing and respiratory symptoms.  Will need intermittent Lasix.  Continue with IV remdesivir.  Continue with Decadron.  Received convalescent plasma 10/20/2020.  Monitor BMP, Intake/Output with a goal of even/negative fluid balance   Continue with airborne and droplet isolation.  Monitor inflammatory marker ferritin D-dimer LDH fibrinogen LFTs CK and CRP periodically   Obtain X-ray chest as needed    Continue pulmonary toilet, aspiration precautions and bronchodilators   Continue to monitor CBC, coagulation profile   Chemical DVT prophylaxis to continue with Lovenox   Antimicrobials reviewed; on Rocephin and Zithromax per infectious disease   Physical/occupational therapy    Discussed with nursing staff, treatment plan discussed with  Discussed with respiratory therapist.    Patient remains critically ill with illness/injury that acutely impairs one or more vital organ systems, such that there is a high probability of imminent or life threatening deterioration in the patient's condition. Critical care time of greater than 30 minutes was spent (excluding procedures), in coordination of care during rounds and discussion of patient care in detail, and recommendations of the team members were adopted in the plan. Necessity of all invasive devices was also confirmed. Rosalee Prado M.D.    Pulmonary and Critical Care Medicine           10/22/2020, 11:27 AM    This patient was evaluated in the context of the global SARS-CoV-2 (COVID-19) pandemic, which necessitated considerations that the patient either has COVID-19 infection or is at risk of infection with COVID-19. Institutional protocols and algorithms that pertain to the evaluation & management of patients with COVID-19 or those at risk for COVID-19 are in a state of rapid changes based on information released by regulatory bodies including the CDC and federal and state organizations. These policies and algorithms were followed during the patient's care. Please note that this chart was generated using voice recognition Dragon dictation software. Although every effort was made to ensure the accuracy of this automated transcription, some errors in transcription may have occurred.

## 2020-10-22 NOTE — PROGRESS NOTES
Regular Covid 19 swab taken from right nare and left nare, labeled, placed in red dot bag, and handed off to second healthcare worker outside of room for transport to laboratory per hospital policy and procedure. Patient tolerated procedure fairly well.

## 2020-10-22 NOTE — RESEARCH
Clinical Research Services  Patient Name: Thom Kapoor  MRN: [id number]  YOB: 1965  Date of Evaluation: 10/22/2020  Time of Evaluation: 08:30  Reason for evaluation: Screening/Enrollment    Asked by Dr. Angelia Thrasher to evaluate a potential patient for [facility] [id number]:  A Phase II, Randomized, Double-Blind, Placebo-Controlled,  Multicenter Study to Evaluate the Safety and Efficacy of [id number] or [id number] in [place] with Severe COVID-19 Pneumonia  NCT # [id number]    :  Sol Benz M.D. [x] Patient met eligibility criteria  [x] The consent was given to the patient on 10/21/2020 by Bhaskar Levine RN, she also faxed the consent to Serena Baumgarten at the patient's request.  On 10/21/2020 at 19:00 I checked back, he was looking at the consent and wanted to consider it more. On 10/22/2020 at 08:30 I called the patient in his room, he stated he had read the consent, I reviewed some additional information with him, he did not have any additional questions. The patient signed the consent at 08:54. [x] A copy of the signed consents given to the patient  [x] Patient educated on purpose of [id number] or [id number] or Placebo for treating Severe COVID-19 Pneumonia. Verbalized understanding. [x]T he subject will receive a one time dose of [id number] 700 mg or [id number] 90 mcg/kg or matching placebo over 1 hour (+ 10 minutes) Subject # 1300      For questions aCrolina Kearney, Dr. Guera Hair, Dr. Sachin Carlos, Dr. Ainsley Mosley, or Dr. Glenn Velasquez or call the Research Office at [phone] Monday - Friday 8 am till 5 pm to speak to the Research Nurse.     Gurvinder Katz RN  Clinical Research Nurse

## 2020-10-22 NOTE — PLAN OF CARE
Problem: Skin Integrity:  Goal: Will show no infection signs and symptoms  Description: Will show no infection signs and symptoms  Outcome: Ongoing  Goal: Absence of new skin breakdown  Description: Absence of new skin breakdown  Outcome: Ongoing     Problem: Falls - Risk of:  Goal: Will remain free from falls  Description: Will remain free from falls  Outcome: Ongoing  Goal: Absence of physical injury  Description: Absence of physical injury  Outcome: Ongoing     Problem: Airway Clearance - Ineffective  Goal: Achieve or maintain patent airway  Outcome: Ongoing     Problem: Gas Exchange - Impaired  Goal: Absence of hypoxia  Outcome: Ongoing  Goal: Promote optimal lung function  Outcome: Ongoing     Problem: Breathing Pattern - Ineffective  Goal: Ability to achieve and maintain a regular respiratory rate  Outcome: Ongoing     Problem:  Body Temperature -  Risk of, Imbalanced  Goal: Ability to maintain a body temperature within defined limits  Outcome: Ongoing  Goal: Will regain or maintain usual level of consciousness  Outcome: Ongoing  Goal: Complications related to the disease process, condition or treatment will be avoided or minimized  Outcome: Ongoing     Problem: Isolation Precautions - Risk of Spread of Infection  Goal: Prevent transmission of infection  Outcome: Ongoing     Problem: Nutrition Deficits  Goal: Optimize nutrtional status  Outcome: Ongoing     Problem: Risk for Fluid Volume Deficit  Goal: Maintain normal heart rhythm  Outcome: Ongoing  Goal: Maintain absence of muscle cramping  Outcome: Ongoing  Goal: Maintain normal serum potassium, sodium, calcium, phosphorus, and pH  Outcome: Ongoing     Problem: Loneliness or Risk for Loneliness  Goal: Demonstrate positive use of time alone when socialization is not possible  Outcome: Ongoing     Problem: Fatigue  Goal: Verbalize increase energy and improved vitality  Outcome: Ongoing     Problem: Patient Education: Go to Patient Education Activity  Goal: Patient/Family Education  Outcome: Ongoing     Problem: Nutrition  Goal: Optimal nutrition therapy  Description: Nutrition Problem #1: Predicted inadequate energy intake  Intervention: Food and/or Nutrient Delivery: Continue Current Diet, Start Oral Nutrition Supplement  Nutritional Goals: Pt to conusme >75% of est'd daily needs via PO     Outcome: Ongoing

## 2020-10-22 NOTE — PROGRESS NOTES
Occupational Therapy   Occupational Therapy Initial Assessment  Date: 10/22/2020   Patient Name: Deepa Narayanan  MRN: [id number]     : 1965    Date of Service: 10/22/2020    Discharge Recommendations:    No therapy recommended at discharge. Assessment   Treatment Diagnosis: COVID  Prognosis: Good  Decision Making: Low Complexity  Patient Education: pt ed on POC, purpose of eval, importance of movement, safety during functional transfers/functional mobility, balancing rest with movement. good return  No Skilled OT: Independent with functional mobility; Independent with ADL's;No OT goals identified  REQUIRES OT FOLLOW UP: No  Activity Tolerance  Activity Tolerance: Patient Tolerated treatment well  Safety Devices  Safety Devices in place: Yes  Type of devices: Call light within reach; Left in chair  Restraints  Initially in place: No         Patient Diagnosis(es): There were no encounter diagnoses. has a past medical history of Hypertension and Patient in clinical research study. has a past surgical history that includes Lithotripsy. Treatment Diagnosis: COVID      Restrictions  Restrictions/Precautions  Restrictions/Precautions: General Precautions, Isolation, Contact Precautions, Up as Tolerated(COVID-19 +)  Required Braces or Orthoses?: No  Position Activity Restriction  Other position/activity restrictions: high flow, up as tolerated    Subjective   General  Patient assessed for rehabilitation services?: Yes  Family / Caregiver Present: No  Diagnosis: COVID  General Comment  Comments: RN ok'd for therapy this afternoon.  Pt agreeable to participate in session and cooperative/pleasant throughout  Patient Currently in Pain: Denies    Oxygen Therapy  O2 Device: Heated high flow cannula  O2 Flow Rate (L/min): 30 L/min    Social/Functional History  Social/Functional History  Lives With: Alone  Type of Home: House  Home Layout: Two level, Bed/Bath upstairs  Home Access: Stairs to enter with rails  Entrance Stairs - Number of Steps: 6  Bathroom Shower/Tub: Tub/Shower unit  Bathroom Toilet: Standard  Home Equipment: (pt reported no use of DME At baseline)  ADL Assistance: [address]: Independent  Homemaking Responsibilities: Yes  Meal Prep Responsibility: Primary  Laundry Responsibility: Primary  Cleaning Responsibility: Primary  Ambulation Assistance: Independent  Transfer Assistance: Independent  Active : Yes  Mode of Transportation: Car  Occupation: Full time employment  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       Objective   Vision: Impaired  Vision Exceptions: Wears glasses at all times  Hearing: Within functional limits    Orientation  Overall Orientation Status: Within Functional Limits     Balance  Sitting Balance: Independent(~5 minutes in chair)  Standing Balance: Supervision  Standing Balance  Time: ~5 minutes  Activity: pt completed functional mobility to window and back to chair.  pt also completed static standing at window  Comment: pt with no LOB and O2 was 91% upon completion  ADL  Feeding: Independent  Grooming: Independent  UE Bathing: Independent  LE Bathing: Independent  UE Dressing: Independent  LE Dressing: Independent  Toileting: Independent  Tone RUE  RUE Tone: Normotonic  Tone LUE  LUE Tone: Normotonic  Coordination  Movements Are Fluid And Coordinated: Yes     Bed mobility  Comment: pt in chair upon arrival and retired to chair at end of session  Transfers  Sit to stand: Supervision  Stand to sit: Supervision  Transfer Comments: supervision for safety     Cognition  Overall Cognitive Status: WFL        Sensation  Overall Sensation Status: WFL        LUE AROM (degrees)  LUE AROM : WFL  Left Hand AROM (degrees)  Left Hand AROM: WFL  RUE AROM (degrees)  RUE AROM : WFL  Right Hand AROM (degrees)  Right Hand AROM: WFL  LUE Strength  Gross LUE Strength: WFL  L Hand General: 5/5  RUE Strength  Gross RUE Strength: WFL  R Hand General: 5/5         Plan   Plan  Times per week: D/C OT    AM-PAC Score        AM-PAC Inpatient Daily Activity Raw Score: 24 (10/22/20 1555)  AM-PAC Inpatient ADL T-Scale Score : 57.54 (10/22/20 1555)  ADL Inpatient CMS 0-100% Score: 0 (10/22/20 1555)  ADL Inpatient CMS G-Code Modifier : [address] (10/22/20 1555)    Therapy Time   Individual Concurrent Group Co-treatment   Time In 1319         Time Out 1335         Minutes 16         Timed Code Treatment Minutes: Yola 1765, OTR/L

## 2020-10-22 NOTE — RESEARCH
Clinical Research Services  Patient Name: Julisa Barnes  MRN: [id number]  YOB: 1965  Date of Evaluation: 10/22/2020  Time of Evaluation: 16:00  Reason for evaluation: infusion     [employer] [id number]:  A Phase II, Randomized, Double-Blind, Placebo-Controlled,  Multicenter Study to Evaluate the Safety and Efficacy of [id number] or [id number] in [place] with Severe COVID-19 Pneumonia  NCT # [id number]    :  Lalito Burt M.D. [x] Patient number: [id number]  [x] The subject received a one time dose of [id number] 700 mg or [id number] 90 mcg/kg or matching placebo over 1 hour (+ 10 minutes)  [x]  Chart reviewed and after discussion with the subjects RN, no adverse reactions were noted from the infusion of the study drug. [x] Post dose PK obtained at 15:32. [x]  Subject continues in follow-up      Mely Coburn RN     Clinical Research Nurse  For questions page or perfect serve Dr. Angelia Fontenot, Dr. Tameka Pedraza, Dr. Merlyn Lopez, Dr Mickey Flores, Dr. Rigo Bourgeois or contact the research nurse at [phone]  For questions [address] [place] [address] Dr. Angelia Fontenot, Dr. Ranjit Estevez, Dr. Merlyn Lopez, Dr. Mickey Flores, or Dr. Rigo Bourgeois or call the Research Office at [phone] Monday - Friday 8 am till 5 pm to speak to the Research Nurse.     Mely Coburn RN  Clinical Research Nurse

## 2020-10-23 LAB
ABSOLUTE EOS #: <0.03 K/UL (ref 0–0.44)
ABSOLUTE IMMATURE GRANULOCYTE: 0.16 K/UL (ref 0–0.3)
ABSOLUTE LYMPH #: 1.63 K/UL (ref 1.1–3.7)
ABSOLUTE MONO #: 0.66 K/UL (ref 0.1–1.2)
ALBUMIN SERPL-MCNC: 2.9 G/DL (ref 3.5–5.2)
ALBUMIN/GLOBULIN RATIO: 0.8 (ref 1–2.5)
ALP BLD-CCNC: 64 U/L (ref 40–129)
ALT SERPL-CCNC: 133 U/L (ref 5–41)
ANION GAP SERPL CALCULATED.3IONS-SCNC: 11 MMOL/L (ref 9–17)
AST SERPL-CCNC: 44 U/L
BASOPHILS # BLD: 0 % (ref 0–2)
BASOPHILS ABSOLUTE: <0.03 K/UL (ref 0–0.2)
BILIRUB SERPL-MCNC: 0.51 MG/DL (ref 0.3–1.2)
BUN BLDV-MCNC: 19 MG/DL (ref 6–20)
BUN/CREAT BLD: ABNORMAL (ref 9–20)
CALCIUM SERPL-MCNC: 8.4 MG/DL (ref 8.6–10.4)
CHLORIDE BLD-SCNC: 105 MMOL/L (ref 98–107)
CO2: 23 MMOL/L (ref 20–31)
CREAT SERPL-MCNC: 0.66 MG/DL (ref 0.7–1.2)
D-DIMER QUANTITATIVE: 0.44 MG/L FEU
DIFFERENTIAL TYPE: ABNORMAL
EOSINOPHILS RELATIVE PERCENT: 0 % (ref 1–4)
FERRITIN: 1036 UG/L (ref 30–400)
FIBRINOGEN: 611 MG/DL (ref 140–420)
GFR AFRICAN AMERICAN: >60 ML/MIN
GFR NON-AFRICAN AMERICAN: >60 ML/MIN
GFR SERPL CREATININE-BSD FRML MDRD: ABNORMAL ML/MIN/{1.73_M2}
GFR SERPL CREATININE-BSD FRML MDRD: ABNORMAL ML/MIN/{1.73_M2}
GLUCOSE BLD-MCNC: 130 MG/DL (ref 70–99)
HCT VFR BLD CALC: 43.2 % (ref 40.7–50.3)
HEMOGLOBIN: 14.2 G/DL (ref 13–17)
IMMATURE GRANULOCYTES: 1 %
INR BLD: 0.9
LACTATE DEHYDROGENASE: 441 U/L (ref 135–225)
LYMPHOCYTES # BLD: 14 % (ref 24–43)
MCH RBC QN AUTO: 31.5 PG (ref 25.2–33.5)
MCHC RBC AUTO-ENTMCNC: 32.9 G/DL (ref 28.4–34.8)
MCV RBC AUTO: 95.8 FL (ref 82.6–102.9)
MONOCYTES # BLD: 6 % (ref 3–12)
NRBC AUTOMATED: 0 PER 100 WBC
PARTIAL THROMBOPLASTIN TIME: 21.7 SEC (ref 20.5–30.5)
PDW BLD-RTO: 12.6 % (ref 11.8–14.4)
PHOSPHORUS: 3.9 MG/DL (ref 2.5–4.5)
PLATELET # BLD: 534 K/UL (ref 138–453)
PLATELET ESTIMATE: ABNORMAL
PMV BLD AUTO: 8.5 FL (ref 8.1–13.5)
POTASSIUM SERPL-SCNC: 4.4 MMOL/L (ref 3.7–5.3)
PROTHROMBIN TIME: 9.8 SEC (ref 9–12)
RBC # BLD: 4.51 M/UL (ref 4.21–5.77)
RBC # BLD: ABNORMAL 10*6/UL
SEG NEUTROPHILS: 79 % (ref 36–65)
SEGMENTED NEUTROPHILS ABSOLUTE COUNT: 9.63 K/UL (ref 1.5–8.1)
SODIUM BLD-SCNC: 139 MMOL/L (ref 135–144)
TOTAL CK: 72 U/L (ref 39–308)
TOTAL PROTEIN: 6.4 G/DL (ref 6.4–8.3)
URIC ACID: 5.5 MG/DL (ref 3.4–7)
WBC # BLD: 12.1 K/UL (ref 3.5–11.3)
WBC # BLD: ABNORMAL 10*3/UL

## 2020-10-23 PROCEDURE — 84100 ASSAY OF PHOSPHORUS: CPT

## 2020-10-23 PROCEDURE — 6360000002 HC RX W HCPCS: Performed by: INTERNAL MEDICINE

## 2020-10-23 PROCEDURE — 85730 THROMBOPLASTIN TIME PARTIAL: CPT

## 2020-10-23 PROCEDURE — 82728 ASSAY OF FERRITIN: CPT

## 2020-10-23 PROCEDURE — 99232 SBSQ HOSP IP/OBS MODERATE 35: CPT | Performed by: INTERNAL MEDICINE

## 2020-10-23 PROCEDURE — 84550 ASSAY OF BLOOD/URIC ACID: CPT

## 2020-10-23 PROCEDURE — 6370000000 HC RX 637 (ALT 250 FOR IP): Performed by: INTERNAL MEDICINE

## 2020-10-23 PROCEDURE — 83615 LACTATE (LD) (LDH) ENZYME: CPT

## 2020-10-23 PROCEDURE — 85379 FIBRIN DEGRADATION QUANT: CPT

## 2020-10-23 PROCEDURE — 2580000003 HC RX 258: Performed by: INTERNAL MEDICINE

## 2020-10-23 PROCEDURE — 85384 FIBRINOGEN ACTIVITY: CPT

## 2020-10-23 PROCEDURE — 99291 CRITICAL CARE FIRST HOUR: CPT | Performed by: INTERNAL MEDICINE

## 2020-10-23 PROCEDURE — 6360000002 HC RX W HCPCS: Performed by: NURSE PRACTITIONER

## 2020-10-23 PROCEDURE — 2060000000 HC ICU INTERMEDIATE R&B

## 2020-10-23 PROCEDURE — 2580000003 HC RX 258: Performed by: NURSE PRACTITIONER

## 2020-10-23 PROCEDURE — 85025 COMPLETE CBC W/AUTO DIFF WBC: CPT

## 2020-10-23 PROCEDURE — 2700000000 HC OXYGEN THERAPY PER DAY

## 2020-10-23 PROCEDURE — 36415 COLL VENOUS BLD VENIPUNCTURE: CPT

## 2020-10-23 PROCEDURE — 2500000003 HC RX 250 WO HCPCS: Performed by: INTERNAL MEDICINE

## 2020-10-23 PROCEDURE — 80053 COMPREHEN METABOLIC PANEL: CPT

## 2020-10-23 PROCEDURE — 85610 PROTHROMBIN TIME: CPT

## 2020-10-23 PROCEDURE — 82550 ASSAY OF CK (CPK): CPT

## 2020-10-23 RX ADMIN — ENOXAPARIN SODIUM 40 MG: 40 INJECTION SUBCUTANEOUS at 20:07

## 2020-10-23 RX ADMIN — CEFTRIAXONE SODIUM 1 G: 1 INJECTION, POWDER, FOR SOLUTION INTRAMUSCULAR; INTRAVENOUS at 20:07

## 2020-10-23 RX ADMIN — DEXAMETHASONE 6 MG: 4 TABLET ORAL at 08:06

## 2020-10-23 RX ADMIN — SODIUM CHLORIDE, PRESERVATIVE FREE 10 ML: 5 INJECTION INTRAVENOUS at 20:41

## 2020-10-23 RX ADMIN — Medication 500 MG: at 20:06

## 2020-10-23 RX ADMIN — REMDESIVIR 100 MG: 100 INJECTION, POWDER, LYOPHILIZED, FOR SOLUTION INTRAVENOUS at 15:50

## 2020-10-23 RX ADMIN — ENOXAPARIN SODIUM 40 MG: 40 INJECTION SUBCUTANEOUS at 08:06

## 2020-10-23 RX ADMIN — SODIUM CHLORIDE, PRESERVATIVE FREE 10 ML: 5 INJECTION INTRAVENOUS at 08:16

## 2020-10-23 RX ADMIN — ZOLPIDEM TARTRATE 5 MG: 5 TABLET ORAL at 20:11

## 2020-10-23 ASSESSMENT — ENCOUNTER SYMPTOMS
COUGH: 1
CHEST TIGHTNESS: 0
ANAL BLEEDING: 0
EYE ITCHING: 0
WHEEZING: 0
SHORTNESS OF BREATH: 0
DIARRHEA: 1
COLOR CHANGE: 0
EYE REDNESS: 0

## 2020-10-23 ASSESSMENT — PAIN SCALES - GENERAL
PAINLEVEL_OUTOF10: 0

## 2020-10-23 NOTE — PLAN OF CARE
Problem: Skin Integrity:  Goal: Will show no infection signs and symptoms  Outcome: Ongoing  Goal: Absence of new skin breakdown  Outcome: Ongoing     Problem: Falls - Risk of:  Goal: Will remain free from falls  Outcome: Ongoing  Goal: Absence of physical injury  Outcome: Ongoing     Problem: Airway Clearance - Ineffective  Goal: Achieve or maintain patent airway  Outcome: Ongoing     Problem: Gas Exchange - Impaired  Goal: Absence of hypoxia  Outcome: Ongoing  Goal: Promote optimal lung function  Outcome: Ongoing     Problem: Breathing Pattern - Ineffective  Goal: Ability to achieve and maintain a regular respiratory rate  Outcome: Ongoing     Problem:  Body Temperature -  Risk of, Imbalanced  Goal: Ability to maintain a body temperature within defined limits  Outcome: Ongoing  Goal: Will regain or maintain usual level of consciousness  Outcome: Ongoing  Goal: Complications related to the disease process, condition or treatment will be avoided or minimized  Outcome: Ongoing     Problem: Isolation Precautions - Risk of Spread of Infection  Goal: Prevent transmission of infection  Outcome: Ongoing     Problem: Nutrition Deficits  Goal: Optimize nutrtional status  Outcome: Ongoing     Problem: Risk for Fluid Volume Deficit  Goal: Maintain normal heart rhythm  Outcome: Ongoing  Goal: Maintain absence of muscle cramping  Outcome: Ongoing  Goal: Maintain normal serum potassium, sodium, calcium, phosphorus, and pH  Outcome: Ongoing     Problem: Loneliness or Risk for Loneliness  Goal: Demonstrate positive use of time alone when socialization is not possible  Outcome: Ongoing     Problem: Fatigue  Goal: Verbalize increase energy and improved vitality  Outcome: Ongoing     Problem: Patient Education: Go to Patient Education Activity  Goal: Patient/Family Education  Outcome: Ongoing     Problem: Nutrition  Goal: Optimal nutrition therapy  Outcome: Ongoing

## 2020-10-23 NOTE — PROGRESS NOTES
PULMONARY/CRITICAL CARE PROGRESS NOTE:  Patient's name:  Magnolia Tamez  Medical Record Number: [id number]  Patient's account/billing number: [de-identified]  Patient's YOB: 1965  Age: 60 y.o. Date of Admission: 10/20/2020 11:14 AM    I personally interviewed/examined the patient, reviewed interval history and interpreted all available radiographic, laboratory data at the time of service. REASON FOR ADMISSION:   COVID-19 pneumonia  Acute hypoxic respiratory failure    LOS: 3    ICU COURSE/INTERVAL HISTORY: 10/23/2020    Overnight events seen chart reviewed other note seen  He remains hemodynamically stable, he is afebrile last 24 hours and T-max is 98.7 in last 24 hours. He is gradually improving clinically better. According to patient he has cough but cough is better he does have a sputum production which is whitish in color he denies chest pain his appetite is improving shortness of breath is better he denies diarrhea or vomiting and nausea. He is sleeping in prone position according to patient at night and denies any problem sleeping he was able to move out of bed to chair. Since yesterday he had not used BiPAP, did not use BiPAP and does not want to use BiPAP at night. He is on high flow nasal cannula 50% 30 L until this morning and he was saturating above 92% he was switched to 5 to 6 L nasal cannula this morning and he is saturating 94% at rest but he does desaturate in upper 80s to 88% when he move and walk. Labs shows  improving AST 44 improving, WBC count is 12.1 stable platelet count 917, ferritin 1036 improving INR 0.9, fibrinogen 611 stable D-dimer 0.44 stable  He is on Decadron, remdesivir IV, received convalescent plasma x1. PT SEEN AND CHART REVIEWED. The patient is a 60 y.o. male who was transferred from [facility] on 10/20/2020.   He was admitted to [facility] on 10/19/2020 when he presented with almost 5 to 6 days history of fever associated with cough gradually worsening shortness of breath, diarrhea with some abdominal cramping without vomiting, headache myalgias body aches and was admitted to ICU there initially he was on nasal cannula and was having increasing requirement of oxygen and was transferred to [place] on 10/19/2020. He initially went to clinic locally and his COVID-19 test was positive on 10/12/2020 per chart. He was having fever while he was in outlying hospital and started empirically on antibiotic, on Decadron for COVID-19 pneumonia. Since he was transferred here he did not have fever spike. He was initially on nasal cannula but was having worsening hypoxia and this morning he was on 6 L nasal cannula then nonrebreather and developed more respiratory distress tachypnea and was placed on BiPAP and pulmonary service was consulted. On initial evaluation he was on BiPAP 14/7/50%, was maintaining saturation 92% on BiPAP  His initial lactic acid was 2.4 on 10/19/2020 which improved to 1.6 BUN and creatinine was normal electrolytes were normal.  AST today is 96 , WBC count today is 12.6 hemoglobin 14.4 hematocrit 43.8 platelet count 976. INR is 0.9 fibrinogen 620.   D-dimer is 0.46        REVIEW OF SYSTEMS -     CONSTITUTIONAL: Negative for  fevers, chills, fatigue, anorexia  EYES:  Negative for  double vision, blurred vision, dry eyes, eye discharge, visual disturbance, irritation, redness, and icterus  HEENT:  Negative for postnasal drip, nasal congestion, epistaxis tinnitus, ear drainage, earaches, sore throat, hoarseness, and voice change  RESPIRATORY: Positive for  dry cough, cough with sputum, dyspnea, negative for wheezing, hemoptysis, chest pain, and pleuritic pain  CARDIOVASCULAR:  Negative for  chest pain, palpitations, orthopnea, PND, exertional chest pressure/discomfort, edema, syncope  GASTROINTESTINAL: Positive for diarrhea, mild abdominal cramps, negative for nausea, vomiting,  constipation,  jaundice, dysphagia, reflux, odynophagia, hematemesis, and hemtochezia  GENITOURINARY:  Negative for frequency, dysuria, nocturia, urinary incontinence, hesitancy, decreased stream, and hematuria  HEMATOLOGIC/LYMPHATIC:  Negative for easy bruising, bleeding, lymphadenopathy, and petechiae  ALLERGIC/IMMUNOLOGIC:  Negative for recurrent infections, urticaria, hay fever, angioedema, anaphylaxis, and drug reactions  ENDOCRINE:  Negative for heat intolerance, cold intolerance, tremor, and weight changes  MUSCULOSKELETAL:  Negative for  myalgias, arthralgias, joint swelling, stiff joints, decreased range of motion, muscle weakness, and bone pain  NEUROLOGICAL: Positive for headaches, negative for dizziness, seizures, memory problems, speech problems, visual disturbance, coordination problems, gait problems, tremor, dysphagia, weakness, numbness, syncope, and tingling  BEHAVIOR/PSYCH: Positive for decreased energy level, decreased concentration, anxiety           Physical Exam:    VITAL SIGNS:  LAST:  /67   Pulse 83   Temp 98.7 °F (37.1 °C)   Resp 28   Ht 5' 9\" (1.753 m)   Wt 224 lb 13.9 oz (102 kg)   SpO2 92%   BMI 33.21 kg/m²   8-24 HR RANGE:  TEMP Temp  Av.7 °F (37.1 °C)  Min: 98.5 °F (36.9 °C)  Max: 99 °F (82.9 °C)   BP Systolic (01XFC), PTT:404 , Min:100 , CPI:370      Diastolic (81LUY), KELIN:06, Min:55, Max:80     PULSE Pulse  Av.6  Min: 59  Max: 98   RR Resp  Av.5  Min: 18  Max: 28   O2 SAT SpO2  Av.8 %  Min: 92 %  Max: 97 %   OXYGEN DELIVERY O2 Flow Rate (L/min)  Av.1 L/min  Min: 5 L/min  Max: 30 L/min        INPUT/OUTPUT:  In: 1000 [P.O.:1000]  Out: 1050 [Urine:1050]       GENERAL APPEARANCE : alert and cooperative with exam  PHYSICAL EXAMINATION:   I have discussed the care of Julisa Barnes, including pertinent history and exam findings,  with the nursing staff. I have seen  the patient and the key elements of all parts of the encounter have been performed by me.  For careful stewardship of limited PPE during COVID-19 pandemic my physical exam was deferred. He was seen and evaluated from outside the room through the window and I have talk to the patient on the phone. General appearance - oriented to person, place, and time, overweight and in no distress. Mental status - alert, oriented to person, place, and time  Chest -mild tachypnea present no retraction or cyanosis.       Diet:  DIET GENERAL;  Dietary Nutrition Supplements: Standard High Calorie Oral Supplement    Medications:Current Inpatient    Scheduled Meds:   dexamethasone  6 mg Oral Daily    sodium chloride flush  10 mL Intravenous 2 times per day    azithromycin  500 mg Intravenous Q24H    And    cefTRIAXone (ROCEPHIN) IV  1 g Intravenous Q24H    influenza virus vaccine  0.5 mL Intramuscular Once    enoxaparin  40 mg Subcutaneous BID    remdesivir IVPB  100 mg Intravenous Q24H     Continuous Infusions:  PRN Meds:loperamide, acetaminophen **OR** acetaminophen, acetaminophen **OR** acetaminophen, albuterol, magnesium hydroxide, nicotine, promethazine **OR** ondansetron, sodium chloride flush, sodium chloride, zolpidem, polyvinyl alcohol  VITAL SIGNS:  LAST:  /67   Pulse 83   Temp 98.7 °F (37.1 °C)   Resp 28   Ht 5' 9\" (1.753 m)   Wt 224 lb 13.9 oz (102 kg)   SpO2 92%   BMI 33.21 kg/m²   8-24 HR RANGE:  TEMP Temp  Av.7 °F (37.1 °C)  Min: 98.5 °F (36.9 °C)  Max: 99 °F (70.4 °C)   BP Systolic (32BAM), UVU:888 , Min:100 , XOU:379      Diastolic (11PHA), PIZ:25, Min:55, Max:80     PULSE Pulse  Av.6  Min: 59  Max: 98   RR Resp  Av.5  Min: 18  Max: 28   O2 SAT SpO2  Av.8 %  Min: 92 %  Max: 97 %   OXYGEN DELIVERY O2 Flow Rate (L/min)  Av.1 L/min  Min: 5 L/min  Max: 30 L/min        VENTILATOR SETTINGS:  Vent Information  Skin Assessment: Clean, dry, & intact  FiO2 : 50 %  SpO2: 92 %  SpO2/FiO2 ratio: 188  I Time/ I Time %: 0.9 s  Humidification Source: Heated wire  Humidification Temp: 33  Humidification Temp Measured: 33  Mask Type: Full face mask  Mask Size: Medium     PaO2/FiO2 RATIO:  No results for input(s): POCPO2 in the last 72 hours. FiO2 : 50 %     INPUT/OUTPUT:  In: 1000 [P.O.:1000]  Out: 1050 [Urine:1050]     LABS:  ABGs:   No results for input(s): POCPH, POCPCO2, POCPO2, POCHCO3, ZLBS4RUN in the last 72 hours. CBC:   Recent Labs     10/21/20  0819 10/22/20  0604 10/23/20  0818   WBC 12.6* 12.6* 12.1*   HGB 14.4 13.9 14.2   HCT 43.8 43.4 43.2   MCV 93.8 96.4 95.8    488* 534*   LYMPHOPCT 8* 12* 14*   RBC 4.67 4.50 4.51   MCH 30.8 30.9 31.5   MCHC 32.9 32.0 32.9   RDW 12.7 12.5 12.6     CRP:   Recent Labs     10/22/20  0604   CRP 38.5*     LDH:   Recent Labs     10/22/20  0604   *     BMP:   Recent Labs     10/21/20  0819 10/22/20  0604 10/23/20  0818    138 139   K 4.0 4.6 4.4    105 105   CO2 24 21 23   BUN 17 23* 19   CREATININE 0.67* 0.78 0.66*   GLUCOSE 149* 161* 130*   PHOS  --  4.4 3.9     Liver Function Test:   Recent Labs     10/21/20  0819 10/22/20  0604 10/23/20  0818   PROT 6.6 6.5 6.4   LABALBU 3.0* 2.8* 2.9*   * 155* 133*   AST 96* 74* 44*   ALKPHOS 66 66 64   BILITOT 0.47 0.50 0.51     Coagulation Profile:   Recent Labs     10/21/20  0819 10/22/20  0604 10/23/20  0818   INR 0.9 1.0 0.9   PROTIME 9.8 10.0 9.8   APTT 22.7 19.3* 21.7     D-Dimer:  Recent Labs     10/21/20  0819 10/22/20  0604 10/23/20  0818   DDIMER 0.46 0.59 0.44     Ferritin:    Recent Labs     10/22/20  0604 10/23/20  0818   FERRITIN 1,784* 1,036*     Lactic Acid:  No results for input(s): LACTA in the last 72 hours. Cardiac Enzymes:  Recent Labs     10/22/20  0604 10/23/20  0818   CKTOTAL 100 72     BNP/ProBNP:   Recent Labs     10/22/20  0604   PROBNP 78     Triglycerides:  No results for input(s): TRIG in the last 72 hours. QTc:     RADIOLOGY:  XR CHEST (SINGLE VIEW FRONTAL)   Final Result   Subtle left lung opacities are nonspecific but could represent pneumonia. Right infrahilar opacity may represent atelectasis versus pneumonia. ASSESSMENT:     Acute hypoxic respiratory failure secondary to COVID-19 pneumonia   Acute respiratory distress syndrome   Bilateral multifocal pneumonia due to COVID 19 infection   Sepsis due to COVID-19.  History of hypertension   History of renal calculi   Obesity/possible obstructive sleep apnea    PLAN:     We will continue to wean off high flow nasal cannula.  We will use nasal cannula 5 to 6 L and alternate with high flow nasal cannula if needed if you desaturate less than 90% we will continue with high flow nasal cannula.  Discussed with nursing staff to have BiPAP at night and intermittently during the daytime as needed or if distress.  Continue with prone positioning at night and also intermittently during the daytime.  Monitor oxygenation, work of breathing and respiratory symptoms.  Will need intermittent Lasix.  Continue with IV remdesivir.  Continue with Decadron.  Received convalescent plasma 10/20/2020.  Monitor BMP, Intake/Output with a goal of even/negative fluid balance   Continue with airborne and droplet isolation.    Monitor inflammatory marker ferritin D-dimer LDH fibrinogen LFTs CK and CRP periodically   Obtain X-ray chest as needed    Continue pulmonary toilet, aspiration precautions and bronchodilators   Continue to monitor CBC, coagulation profile   Chemical DVT prophylaxis to continue with Lovenox   Antimicrobials reviewed; on Rocephin and Zithromax per infectious disease   Physical/occupational therapy  His oxygenation is improving and overall improved did not require BiPAP respiratory distress improving and okay to transfer to stepdown unit with careful observation    Discussed with nursing staff, treatment plan discussed with  Discussed with respiratory therapist.    Patient remains critically ill with illness/injury that acutely impairs one or more vital organ systems, such that there is a high probability of imminent or life threatening deterioration in the patient's condition. Critical care time of greater than 30 minutes was spent (excluding procedures), in coordination of care during rounds and discussion of patient care in detail, and recommendations of the team members were adopted in the plan. Necessity of all invasive devices was also confirmed. Madeline Barriga M.D. Pulmonary and Critical Care Medicine           10/23/2020, 12:25 PM    This patient was evaluated in the context of the global SARS-CoV-2 (COVID-19) pandemic, which necessitated considerations that the patient either has COVID-19 infection or is at risk of infection with COVID-19. Institutional protocols and algorithms that pertain to the evaluation & management of patients with COVID-19 or those at risk for COVID-19 are in a state of rapid changes based on information released by regulatory bodies including the CDC and federal and state organizations. These policies and algorithms were followed during the patient's care. Please note that this chart was generated using voice recognition Dragon dictation software. Although every effort was made to ensure the accuracy of this automated transcription, some errors in transcription may have occurred.   Walk

## 2020-10-23 NOTE — PLAN OF CARE
Problem: Skin Integrity:  Goal: Will show no infection signs and symptoms  Description: Will show no infection signs and symptoms  10/23/2020 0820 by Bill Ambrosio RN  Outcome: Ongoing  10/23/2020 0430 by Lalo Frausto RN  Outcome: Ongoing  Goal: Absence of new skin breakdown  Description: Absence of new skin breakdown  10/23/2020 0820 by Bill Ambrosio RN  Outcome: Ongoing  10/23/2020 0430 by Lalo Frausto RN  Outcome: Ongoing     Problem: Falls - Risk of:  Goal: Will remain free from falls  Description: Will remain free from falls  10/23/2020 0820 by Bill Ambrosio RN  Outcome: Ongoing  10/23/2020 0430 by Lalo Frausto RN  Outcome: Ongoing  Goal: Absence of physical injury  Description: Absence of physical injury  10/23/2020 0820 by Bill Ambrosio RN  Outcome: Ongoing  10/23/2020 0430 by Lalo Frausto RN  Outcome: Ongoing     Problem: Airway Clearance - Ineffective  Goal: Achieve or maintain patent airway  10/23/2020 0820 by Bill Ambrosio RN  Outcome: Ongoing  10/23/2020 0430 by Lalo Frausto RN  Outcome: Ongoing     Problem: Gas Exchange - Impaired  Goal: Absence of hypoxia  10/23/2020 0820 by Bill Ambrosio RN  Outcome: Ongoing  10/23/2020 0745 by Radha Hernández RCP  Outcome: Ongoing  10/23/2020 0430 by Lalo Frausto RN  Outcome: Ongoing  Goal: Promote optimal lung function  10/23/2020 0820 by Bill Ambrosio RN  Outcome: Ongoing  10/23/2020 0745 by Radha Hernández RCP  Outcome: Ongoing  10/23/2020 0430 by Lalo Frausto RN  Outcome: Ongoing     Problem: Breathing Pattern - Ineffective  Goal: Ability to achieve and maintain a regular respiratory rate  10/23/2020 0820 by Bill Ambrosio RN  Outcome: Ongoing  10/23/2020 0745 by Radha Hernández RCP  Outcome: Ongoing  10/23/2020 0430 by Lalo Frausto RN  Outcome: Ongoing     Problem:  Body Temperature -  Risk of, Imbalanced  Goal: Ability to maintain a body temperature within defined limits  10/23/2020 0820 by Bill Ambrosio RN  Outcome: Ongoing  10/23/2020 0430 by Ashtyn Sneed RN  Outcome: Ongoing  Goal: Will regain or maintain usual level of consciousness  10/23/2020 0820 by Ja Contreras RN  Outcome: Ongoing  10/23/2020 0430 by Ashtyn Sneed RN  Outcome: Ongoing  Goal: Complications related to the disease process, condition or treatment will be avoided or minimized  10/23/2020 0820 by Ja Contreras RN  Outcome: Ongoing  10/23/2020 0430 by Ashtyn Sneed RN  Outcome: Ongoing     Problem: Isolation Precautions - Risk of Spread of Infection  Goal: Prevent transmission of infection  10/23/2020 0820 by Ja Contreras RN  Outcome: Ongoing  10/23/2020 0430 by Ashtyn Sneed RN  Outcome: Ongoing     Problem: Nutrition Deficits  Goal: Optimize nutrtional status  10/23/2020 0820 by Ja Contreras RN  Outcome: Ongoing  10/23/2020 0430 by Ashtyn Sneed RN  Outcome: Ongoing     Problem: Risk for Fluid Volume Deficit  Goal: Maintain normal heart rhythm  10/23/2020 0820 by Ja Contreras RN  Outcome: Ongoing  10/23/2020 0430 by Ashtyn Sneed RN  Outcome: Ongoing  Goal: Maintain absence of muscle cramping  10/23/2020 0820 by Ja Contreras RN  Outcome: Ongoing  10/23/2020 0430 by Ashtyn Sneed RN  Outcome: Ongoing  Goal: Maintain normal serum potassium, sodium, calcium, phosphorus, and pH  10/23/2020 0820 by Ja Contreras RN  Outcome: Ongoing  10/23/2020 0430 by Ashtyn Sneed RN  Outcome: Ongoing     Problem: Loneliness or Risk for Loneliness  Goal: Demonstrate positive use of time alone when socialization is not possible  10/23/2020 0820 by Ja Contreras RN  Outcome: Ongoing  10/23/2020 0430 by Ashtyn Sneed RN  Outcome: Ongoing     Problem: Fatigue  Goal: Verbalize increase energy and improved vitality  10/23/2020 0820 by Ja Contreras RN  Outcome: Ongoing  10/23/2020 0430 by Ashtyn Sneed RN  Outcome: Ongoing     Problem: Patient Education: Go to Patient Education Activity  Goal: Patient/Family Education  10/23/2020 0820 by Katie Castillo RN  Outcome: Ongoing  10/23/2020 0430 by Jeannette Castillo RN  Outcome: Ongoing     Problem: Nutrition  Goal: Optimal nutrition therapy  Description: Nutrition Problem #1: Predicted inadequate energy intake  Intervention: Food and/or Nutrient Delivery: Continue Current Diet, Start Oral Nutrition Supplement  Nutritional Goals: Pt to conusme >75% of est'd daily needs via PO     10/23/2020 0820 by Katie Castillo RN  Outcome: Ongoing  10/23/2020 0430 by Jeannette Castillo RN  Outcome: Ongoing

## 2020-10-23 NOTE — PLAN OF CARE
Problem: Airway Clearance - Ineffective  Goal: Achieve or maintain patent airway  10/23/2020 0430 by Ashly Thorpe RN  Outcome: Ongoing     Problem: Gas Exchange - Impaired  Goal: Absence of hypoxia  10/23/2020 0745 by Sheba Houston RCP  Outcome: Ongoing  10/23/2020 0430 by Ashly Thorpe RN  Outcome: Ongoing  Goal: Promote optimal lung function  10/23/2020 0745 by Sheba Houston RCP  Outcome: Ongoing  10/23/2020 0430 by Ashly Thorpe RN  Outcome: Ongoing     Problem: Breathing Pattern - Ineffective  Goal: Ability to achieve and maintain a regular respiratory rate  10/23/2020 0745 by Sheba Houston RCP  Outcome: Ongoing  10/23/2020 0430 by Ashly Thorpe RN  Outcome: Ongoing

## 2020-10-23 NOTE — PROGRESS NOTES
[facility]  Office: [address], DO, Patricia Hammondy, DO, Silas Signs, DO, Radha Casey Blood, DO, Luzma Phipps MD, Mignon Meckel, MD, Lisandra Powers MD, Rocio Dickens MD, Humaira Cline MD, Oleg Flower MD, Julia Guerrero MD, Randy Mcnally MD, Ranjit Gavin MD, Rosario Nova, DO, Emma Hadley MD, Helio Walsh MD, Brandy Livingston, DO, Sidney Kilgore MD,  Navin Pedro, DO, Mimi Cotter MD, Sarah Orona MD, Ashley Paige, Jam Houston, CNP, Hazel Yee, CNP, Anupam Brandt, CNS, Kajal Awad, CNP, Елена Carbone, CNP, Arsenio Godoy, CNP, Salomon Pruett, CNP, Silvestre Mccarthy, CNP, Eleanor Taylor PA-C, Natalia Iverson, [facility], Maeve Stephens, CNP, Izabela Prater, CNP, Anthony Blanco, CNP, Suzy Platt, CNP, Caryle Poet, [address] [place] [address]    Progress Note    10/23/2020    3:06 PM    Name:   Rena Ni  MRN:     [id number]     Acct:      [de-identified]   Room:   [address] [place] [address] Day:  3  Admit Date:  10/20/2020 11:14 AM    PCP:   Darline Caballero MD  Code Status:  Full Code    Subjective:     C/C: No chief complaint on file. SOB  Interval History Status: worsened.      Seen and examined face-to-face,  Discussed with pulmonary,  Infectious disease on board,  Shortness of breath little   On high flow oxygen at the rate of 50% FiO2    Brief History:   A 78-year-old gentleman with underlying history of hypertension presented with increased shortness of breath which started few days back, ended up into the ER with increasing need of oxygen, his shortness of breath and cough started on October 9, tested positive for Covid on October 12 subsequently got worse and more short of breath and went to the local ER on October 19 with diffuse Covid related pneumonia, needing oxygen, D-dimer was 820, CT of the chest did not show any PE but bilateral groundglass multifocal pneumonia consistent with Covid 19 pneumonia.,  Transferred to [address] [place] [address],[address] for further management,  Has already been seen by infectious disease and started on remdesivir, patient already started feeling better    October 22,  Acute respiratory failure,  Sepsis due to Covid pneumonia  Covid pneumonia,  Hypertension,  Obesity,  History of smoking,  Shortness of breath that has been improving today,  Patient on remdesivir, already got the plasma, Decadron    October 23,  Acute respiratory failure,  Sepsis due to Covid pneumonia  Covid pneumonia,  Hypertension,  Obesity,  History of smoking,  Shortness of breath that has been improving   Patient on remdesivir, already got the plasma, Decadron      Review of Systems:     Constitutional:  negative for chills, fevers, sweats  Respiratory: Shortness of breath  Cardiovascular:  negative for chest pain, chest pressure/discomfort, lower extremity edema, palpitations  Gastrointestinal:  negative for abdominal pain, constipation, diarrhea, nausea, vomiting  Neurological:  negative for dizziness, headache    Medications: Allergies: Allergies   Allergen Reactions    No Known Allergies        Current Meds:   Scheduled Meds:    dexamethasone  6 mg Oral Daily    sodium chloride flush  10 mL Intravenous 2 times per day    azithromycin  500 mg Intravenous Q24H    And    cefTRIAXone (ROCEPHIN) IV  1 g Intravenous Q24H    influenza virus vaccine  0.5 mL Intramuscular Once    enoxaparin  40 mg Subcutaneous BID    remdesivir IVPB  100 mg Intravenous Q24H     Continuous Infusions:   PRN Meds: loperamide, acetaminophen **OR** acetaminophen, acetaminophen **OR** acetaminophen, albuterol, magnesium hydroxide, nicotine, promethazine **OR** ondansetron, sodium chloride flush, sodium chloride, zolpidem, polyvinyl alcohol    Data:     Past Medical History:   has a past medical history of Hypertension and Patient in clinical research study. Social History:   reports that he has quit smoking.  He quit after 20.00 years of use. He has never used smokeless tobacco. He reports that he does not drink alcohol or use drugs. Family History: History reviewed. No pertinent family history. Vitals:  /67   Pulse 83   Temp 98.7 °F (37.1 °C)   Resp 28   Ht 5' 9\" (1.753 m)   Wt 224 lb 13.9 oz (102 kg)   SpO2 92%   BMI 33.21 kg/m²   Temp (24hrs), Av.7 °F (37.1 °C), Min:98.5 °F (36.9 °C), Max:99 °F (37.2 °C)    Recent Labs     10/21/20  1139 10/21/20  1748 10/22/20  0854   POCGLU 122* 112* 124*       I/O (24Hr):     Intake/Output Summary (Last 24 hours) at 10/23/2020 1506  Last data filed at 10/23/2020 0819  Gross per 24 hour   Intake 1000 ml   Output 1050 ml   Net -50 ml       Labs:  Hematology:  Recent Labs     10/21/20  0819 10/22/20  0604 10/23/20  0818   WBC 12.6* 12.6* 12.1*   RBC 4.67 4.50 4.51   HGB 14.4 13.9 14.2   HCT 43.8 43.4 43.2   MCV 93.8 96.4 95.8   MCH 30.8 30.9 31.5   MCHC 32.9 32.0 32.9   RDW 12.7 12.5 12.6    488* 534*   MPV 8.4 8.7 8.5   CRP  --  38.5*  --    INR 0.9 1.0 0.9   DDIMER 0.46 0.59 0.44     Chemistry:  Recent Labs     10/21/20  0819 10/22/20  0604 10/23/20  0818    138 139   K 4.0 4.6 4.4    105 105   CO2 24 21 23   GLUCOSE 149* 161* 130*   BUN 17 23* 19   CREATININE 0.67* 0.78 0.66*   ANIONGAP 12 12 11   LABGLOM >60 >60 >60   GFRAA >60 >60 >60   CALCIUM 8.9 8.5* 8.4*   PHOS  --  4.4 3.9   PROBNP  --  78  --    TROPHS  --  <6  --    CKTOTAL  --  100 72     Recent Labs     10/21/20  0819 10/21/20  1139 10/21/20  1748 10/22/20  0604 10/22/20  0854 10/23/20  0818   PROT 6.6  --   --  6.5  --  6.4   LABALBU 3.0*  --   --  2.8*  --  2.9*   AST 96*  --   --  74*  --  44*   *  --   --  155*  --  133*   LDH  --   --   --  783*  --  441*   ALKPHOS 66  --   --  66  --  64   BILITOT 0.47  --   --  0.50  --  0.51   URICACID  --   --   --  6.0  --  5.5   POCGLU  --  122* 112*  --  124*  --      ABG:No results found for: POCPH, PHART, PH, POCPCO2, YDZ1ITY, PCO2, POCPO2, PO2ART, PO2, POCHCO3, EEQ8ODW, HCO3, NBEA, PBEA, BEART, BE, THGBART, THB, CLE7LPC, SNLO2PDM, B4IIIOLY, O2SAT, FIO2  Lab Results   Component Value Date/Time    SPECIAL NOT REPORTED 10/22/2020 12:00 PM     No results found for: CULTURE    Radiology:  Xr Chest (single View Frontal)    Result Date: 10/21/2020  Subtle left lung opacities are nonspecific but could represent pneumonia. Right infrahilar opacity may represent atelectasis versus pneumonia. Cta Chest W Contrast    Result Date: 10/19/2020  1. Study degraded by motion. 2. No evidence of central PE or secondary signs of PE. 3. Multilobar groundglass opacities in a peripheral distribution raises suspicion of multilobar pneumonia, potentially of viral etiology. Physical Examination:        General appearance:  alert, cooperative and no distress  Mental Status:  oriented to person, place and time and normal affect  Lungs: Bilateral coarse breath sounds, basal crackles  Heart:  regular rate and rhythm, no murmur  Abdomen:  soft, nontender, nondistended, normal bowel sounds, no masses, hepatomegaly, splenomegaly  Extremities:  no edema, redness, tenderness in the calves  Skin:  no gross lesions, rashes, induration    Assessment:        Hospital Problems           Last Modified POA    * (Principal) Acute respiratory failure with hypoxia ([address] 10/20/2020 Yes    HTN (hypertension), benign 10/20/2020 Yes    Pneumonia due to COVID-19 virus 10/20/2020 Yes    Sepsis due to COVID-19 ([address] 10/20/2020 Yes    Primary insomnia 10/20/2020 Yes          Plan:        1. Covid pneumonia, acute respiratory failure, sepsis, infectious disease on board, pulmonary on board, on high flow oxygen with FiO2 50%,   2. ID started the patient on remdesivir, convalescent plasma given, Decadron,  3. Monitor labs,  4. Ambien for insomnia,  5. DVT prophylaxis,  6.  Full CODE STATUS    Marilynn Cartwright MD  10/23/2020  3:06 PM

## 2020-10-23 NOTE — PROGRESS NOTES
Attempted to call sister Natasha Jacome for update on patient condition - call to voicemail - message left to call w/ any questions.     Update - sister called back - RN answered all questions

## 2020-10-24 LAB
ABSOLUTE EOS #: 0.04 K/UL (ref 0–0.44)
ABSOLUTE IMMATURE GRANULOCYTE: 0.33 K/UL (ref 0–0.3)
ABSOLUTE LYMPH #: 1.95 K/UL (ref 1.1–3.7)
ABSOLUTE MONO #: 1 K/UL (ref 0.1–1.2)
ALBUMIN SERPL-MCNC: 2.7 G/DL (ref 3.5–5.2)
ALBUMIN/GLOBULIN RATIO: 0.8 (ref 1–2.5)
ALP BLD-CCNC: 60 U/L (ref 40–129)
ALT SERPL-CCNC: 104 U/L (ref 5–41)
ANION GAP SERPL CALCULATED.3IONS-SCNC: 12 MMOL/L (ref 9–17)
AST SERPL-CCNC: 38 U/L
BASOPHILS # BLD: 1 % (ref 0–2)
BASOPHILS ABSOLUTE: 0.07 K/UL (ref 0–0.2)
BILIRUB SERPL-MCNC: 0.43 MG/DL (ref 0.3–1.2)
BUN BLDV-MCNC: 18 MG/DL (ref 6–20)
BUN/CREAT BLD: ABNORMAL (ref 9–20)
C-REACTIVE PROTEIN: 53 MG/L (ref 0–5)
CALCIUM SERPL-MCNC: 8.4 MG/DL (ref 8.6–10.4)
CHLORIDE BLD-SCNC: 105 MMOL/L (ref 98–107)
CO2: 21 MMOL/L (ref 20–31)
CREAT SERPL-MCNC: 0.58 MG/DL (ref 0.7–1.2)
D-DIMER QUANTITATIVE: 0.47 MG/L FEU
DIFFERENTIAL TYPE: ABNORMAL
EKG ATRIAL RATE: 95 BPM
EKG P AXIS: 36 DEGREES
EKG P-R INTERVAL: 140 MS
EKG Q-T INTERVAL: 374 MS
EKG QRS DURATION: 100 MS
EKG QTC CALCULATION (BAZETT): 469 MS
EKG R AXIS: -27 DEGREES
EKG T AXIS: 1 DEGREES
EKG VENTRICULAR RATE: 95 BPM
EOSINOPHILS RELATIVE PERCENT: 0 % (ref 1–4)
FERRITIN: 827 UG/L (ref 30–400)
FIBRINOGEN: 613 MG/DL (ref 140–420)
GFR AFRICAN AMERICAN: >60 ML/MIN
GFR NON-AFRICAN AMERICAN: >60 ML/MIN
GFR SERPL CREATININE-BSD FRML MDRD: ABNORMAL ML/MIN/{1.73_M2}
GFR SERPL CREATININE-BSD FRML MDRD: ABNORMAL ML/MIN/{1.73_M2}
GLUCOSE BLD-MCNC: 108 MG/DL (ref 70–99)
HCT VFR BLD CALC: 43.2 % (ref 40.7–50.3)
HEMOGLOBIN: 13.6 G/DL (ref 13–17)
IMMATURE GRANULOCYTES: 2 %
INR BLD: 1
LACTATE DEHYDROGENASE: 430 U/L (ref 135–225)
LYMPHOCYTES # BLD: 14 % (ref 24–43)
MCH RBC QN AUTO: 30.6 PG (ref 25.2–33.5)
MCHC RBC AUTO-ENTMCNC: 31.5 G/DL (ref 28.4–34.8)
MCV RBC AUTO: 97.1 FL (ref 82.6–102.9)
MONOCYTES # BLD: 7 % (ref 3–12)
NRBC AUTOMATED: 0 PER 100 WBC
PARTIAL THROMBOPLASTIN TIME: 22.1 SEC (ref 20.5–30.5)
PDW BLD-RTO: 12.5 % (ref 11.8–14.4)
PHOSPHORUS: 2.9 MG/DL (ref 2.5–4.5)
PLATELET # BLD: 498 K/UL (ref 138–453)
PLATELET ESTIMATE: ABNORMAL
PMV BLD AUTO: 9.1 FL (ref 8.1–13.5)
POTASSIUM SERPL-SCNC: 4.4 MMOL/L (ref 3.7–5.3)
PROTHROMBIN TIME: 10.1 SEC (ref 9–12)
RBC # BLD: 4.45 M/UL (ref 4.21–5.77)
RBC # BLD: ABNORMAL 10*6/UL
SEG NEUTROPHILS: 76 % (ref 36–65)
SEGMENTED NEUTROPHILS ABSOLUTE COUNT: 10.16 K/UL (ref 1.5–8.1)
SODIUM BLD-SCNC: 138 MMOL/L (ref 135–144)
TOTAL CK: 92 U/L (ref 39–308)
TOTAL PROTEIN: 6.3 G/DL (ref 6.4–8.3)
URIC ACID: 5.4 MG/DL (ref 3.4–7)
WBC # BLD: 13.6 K/UL (ref 3.5–11.3)
WBC # BLD: ABNORMAL 10*3/UL

## 2020-10-24 PROCEDURE — 99232 SBSQ HOSP IP/OBS MODERATE 35: CPT | Performed by: INTERNAL MEDICINE

## 2020-10-24 PROCEDURE — 6370000000 HC RX 637 (ALT 250 FOR IP): Performed by: INTERNAL MEDICINE

## 2020-10-24 PROCEDURE — 85025 COMPLETE CBC W/AUTO DIFF WBC: CPT

## 2020-10-24 PROCEDURE — 84550 ASSAY OF BLOOD/URIC ACID: CPT

## 2020-10-24 PROCEDURE — 80053 COMPREHEN METABOLIC PANEL: CPT

## 2020-10-24 PROCEDURE — 2580000003 HC RX 258: Performed by: INTERNAL MEDICINE

## 2020-10-24 PROCEDURE — 2580000003 HC RX 258: Performed by: NURSE PRACTITIONER

## 2020-10-24 PROCEDURE — 86140 C-REACTIVE PROTEIN: CPT

## 2020-10-24 PROCEDURE — 85379 FIBRIN DEGRADATION QUANT: CPT

## 2020-10-24 PROCEDURE — 85610 PROTHROMBIN TIME: CPT

## 2020-10-24 PROCEDURE — 2060000000 HC ICU INTERMEDIATE R&B

## 2020-10-24 PROCEDURE — 84100 ASSAY OF PHOSPHORUS: CPT

## 2020-10-24 PROCEDURE — 85384 FIBRINOGEN ACTIVITY: CPT

## 2020-10-24 PROCEDURE — 36415 COLL VENOUS BLD VENIPUNCTURE: CPT

## 2020-10-24 PROCEDURE — 6360000002 HC RX W HCPCS: Performed by: INTERNAL MEDICINE

## 2020-10-24 PROCEDURE — 82550 ASSAY OF CK (CPK): CPT

## 2020-10-24 PROCEDURE — 2500000003 HC RX 250 WO HCPCS: Performed by: INTERNAL MEDICINE

## 2020-10-24 PROCEDURE — 6360000002 HC RX W HCPCS: Performed by: NURSE PRACTITIONER

## 2020-10-24 PROCEDURE — 83615 LACTATE (LD) (LDH) ENZYME: CPT

## 2020-10-24 PROCEDURE — 82728 ASSAY OF FERRITIN: CPT

## 2020-10-24 PROCEDURE — 85730 THROMBOPLASTIN TIME PARTIAL: CPT

## 2020-10-24 PROCEDURE — 99291 CRITICAL CARE FIRST HOUR: CPT | Performed by: INTERNAL MEDICINE

## 2020-10-24 RX ORDER — BENZONATATE 100 MG/1
100 CAPSULE ORAL 3 TIMES DAILY PRN
Status: DISCONTINUED | OUTPATIENT
Start: 2020-10-24 | End: 2020-10-26 | Stop reason: HOSPADM

## 2020-10-24 RX ADMIN — SODIUM CHLORIDE, PRESERVATIVE FREE 10 ML: 5 INJECTION INTRAVENOUS at 20:40

## 2020-10-24 RX ADMIN — Medication 500 MG: at 19:53

## 2020-10-24 RX ADMIN — ENOXAPARIN SODIUM 40 MG: 40 INJECTION SUBCUTANEOUS at 19:53

## 2020-10-24 RX ADMIN — ENOXAPARIN SODIUM 40 MG: 40 INJECTION SUBCUTANEOUS at 08:32

## 2020-10-24 RX ADMIN — REMDESIVIR 100 MG: 100 INJECTION, POWDER, LYOPHILIZED, FOR SOLUTION INTRAVENOUS at 16:43

## 2020-10-24 RX ADMIN — BENZONATATE 100 MG: 100 CAPSULE ORAL at 19:55

## 2020-10-24 RX ADMIN — SODIUM CHLORIDE, PRESERVATIVE FREE 10 ML: 5 INJECTION INTRAVENOUS at 08:33

## 2020-10-24 RX ADMIN — CEFTRIAXONE SODIUM 1 G: 1 INJECTION, POWDER, FOR SOLUTION INTRAMUSCULAR; INTRAVENOUS at 19:53

## 2020-10-24 RX ADMIN — DEXAMETHASONE 6 MG: 4 TABLET ORAL at 08:32

## 2020-10-24 RX ADMIN — ZOLPIDEM TARTRATE 5 MG: 5 TABLET ORAL at 19:54

## 2020-10-24 ASSESSMENT — ENCOUNTER SYMPTOMS
EYE REDNESS: 0
COUGH: 1
COLOR CHANGE: 0
CHEST TIGHTNESS: 0
EYE ITCHING: 0
SHORTNESS OF BREATH: 0
PHOTOPHOBIA: 0
ANAL BLEEDING: 0
WHEEZING: 0
EYE PAIN: 0
DIARRHEA: 1

## 2020-10-24 NOTE — PROGRESS NOTES
[facility] -   Infectious diseases evaluation  admission date 10/20/2020    reason for consultation:   covid    Impression :   Current:  · covid diffuse illness  X 10/9/20  · Diffuse covid pneumonia   · Sepsis from covid  · lymphopenia  · elevated d-dimer 800  · transaminitis from covid    Other:  ·   Discussion / summary of stay / plan of care   ·   Recommendations   · Remdesevir x 5 days till 10/24  · Watch LFT  · Steroids and lovenox started 10/19  · Watch inflamm markers  · 1 Unit of plasma - pt consented 10/20/20, completed 10/20 5:30 PM  · Did not fit criteria for nitric oxide,  · Signed for the genetic study  · In general starting to stabilize  Patient comforted    Infection Control Recommendations   · [place] Precautions  · Contact Isolation   · Airborne isolation  · Droplet Isolation      Antimicrobial Stewardship Recommendations   · Simplification of therapy  · Targeted therapy    Coordination ofOutpatient Care:   · Estimated Length of IV antimicrobials:  · Patient will need Midline / picc Catheter Insertion:   · Patient will need SNF:  · Patient will need outpatient wound care:     History of Present Illness:   Initial history:  Argelia Ordaz is a 60y.o.-year-old male w cough and sob x 10/9 and tested + covid 10/12- got worse and more SOB and went to [facility] hospital 10/19 w diffuse covid . pniu and needing O2, D-dimer 820, transferred to us this am, liver enz elevated and lymphopenia, lost wet x ;ast week due to diarrhea, poor appetite till today when he started eating. Fever and dry cough, SOB and diarrhea, no rash, were his symptoms  No sore throat and no poor smell of the food. Liver enz     Interval changes  10/23/2020   Due to the current efforts to prevent transmission of COVID-19 and also the need to preserve PPE for other caregivers, a face-to-face encounter with the patient was not performed.  That being said, all relevant records and diagnostic tests were reviewed, including laboratory results and imaging. Patient was evaluated from the window, called on the phone, and chart reviewed, disc w  involved healthcare workers. Feels much better  Eating better  Less SOB - on NC, So2 91 - 92  Diarrhea better  He signed for the [place] study  Was not a candidate for nitric oxide at this time  Received the immune plasma at 5/30/2010/20, tolerated it well    Summary of relevant labs:  Labs:  WBC 11-12  ALC 0.63    [facility]        Creat normal  D-Dimer 820  Fibrinogen 620    Micro:  covid + [facility] 10/12  Imaging:  CXR 10/21  Subtle left lung opacities are nonspecific but could represent pneumonia.         Right infrahilar opacity may represent atelectasis versus pneumonia         CT chest 10/19 multifocal ground glass covid like pneumonia       I have personally reviewed the past medical history, past surgical history, medications, social history, and family history, and I haveupdated the database accordingly.   Past Medical History:     Past Medical History:   Diagnosis Date    Hypertension     Patient in clinical research study 10/22/2020    Enrolled in [employer] study for [place] expected completion 12/22/2020       Past Surgical  History:     Past Surgical History:   Procedure Laterality Date    LITHOTRIPSY      x4 all together       Medications:      dexamethasone  6 mg Oral Daily    sodium chloride flush  10 mL Intravenous 2 times per day    azithromycin  500 mg Intravenous Q24H    And    cefTRIAXone (ROCEPHIN) IV  1 g Intravenous Q24H    influenza virus vaccine  0.5 mL Intramuscular Once    enoxaparin  40 mg Subcutaneous BID    remdesivir IVPB  100 mg Intravenous Q24H       Social History:     Social History     Socioeconomic History    Marital status: Unknown     Spouse name: Not on file    Number of children: Not on file    Years of education: Not on file    Highest education level: Not on file Occupational History    Not on file   Social Needs    Financial resource strain: Not on file    Food insecurity     Worry: Not on file     Inability: Not on file    Transportation needs     Medical: Not on file     Non-medical: Not on file   Tobacco Use    Smoking status: Former Smoker     Years: 20.00    Smokeless tobacco: Never Used   Substance and Sexual Activity    Alcohol use: Never     Frequency: Never    Drug use: Never    Sexual activity: Not on file   Lifestyle    Physical activity     Days per week: Not on file     Minutes per session: Not on file    Stress: Not on file   Relationships    Social connections     Talks on phone: Not on file     Gets together: Not on file     Attends Congregational service: Not on file     Active member of club or organization: Not on file     Attends meetings of clubs or organizations: Not on file     Relationship status: Not on file    Intimate partner violence     Fear of current or ex partner: Not on file     Emotionally abused: Not on file     Physically abused: Not on file     Forced sexual activity: Not on file   Other Topics Concern    Not on file   Social History Narrative    Not on file       Family History:   History reviewed. No pertinent family history. Allergies:   No known allergies     Review of Systems:     Review of Systems   Constitutional: Negative for activity change, appetite change, chills, fatigue and fever. HENT: Negative for congestion. Eyes: Negative for redness and itching. Respiratory: Positive for cough. Negative for chest tightness, shortness of breath and wheezing. Cardiovascular: Negative for chest pain. Gastrointestinal: Positive for diarrhea (mushy stools). Negative for anal bleeding. Endocrine: Negative for heat intolerance. Genitourinary: Negative for dysuria. Musculoskeletal: Negative for arthralgias. Skin: Negative for color change. Allergic/Immunologic: Negative for immunocompromised state. Neurological: Negative for facial asymmetry. Hematological: Negative for adenopathy. Psychiatric/Behavioral: Negative for agitation. Physical Examination :     Patient Vitals for the past 8 hrs:   BP Temp Temp src Pulse Resp SpO2   10/23/20 2059 -- -- -- -- 22 91 %   10/23/20 2006 (!) 155/69 -- -- -- -- --   10/23/20 2000 -- 98.1 °F (36.7 °C) Oral -- -- --   10/23/20 1800 -- -- -- 79 27 94 %   10/23/20 1700 -- -- -- 102 28 92 %   10/23/20 1630 128/60 -- -- 88 26 90 %   10/23/20 1600 -- 98.8 °F (37.1 °C) Oral 84 26 93 %       Physical Exam  Constitutional:       General: He is not in acute distress. Appearance: He is normal weight. HENT:      Head: Normocephalic and atraumatic. Nose: No congestion. Mouth/Throat:      Mouth: Mucous membranes are moist.   Eyes:      General: No scleral icterus. Conjunctiva/sclera: Conjunctivae normal.      Pupils: Pupils are equal, round, and reactive to light. Pulmonary:      Effort: No respiratory distress. Breath sounds: Rales present. Abdominal:      General: There is no distension. Palpations: Abdomen is soft. Tenderness: There is no abdominal tenderness. Genitourinary:     Comments: No sands  Musculoskeletal:         General: No swelling or deformity. Right lower leg: No edema. Left lower leg: No edema. Skin:     General: Skin is dry. Coloration: Skin is not jaundiced or pale. Neurological:      General: No focal deficit present. Mental Status: He is alert and oriented to person, place, and time. Cranial Nerves: No cranial nerve deficit. Sensory: No sensory deficit. Psychiatric:         Mood and Affect: Mood normal.         Thought Content:  Thought content normal.           Medical Decision Making:   I have independently reviewed/ordered the following labs:    CBC with Differential:   Recent Labs     10/22/20  0604 10/23/20  0818   WBC 12.6* 12.1*   HGB 13.9 14.2   HCT 43.4 43.2   * 534*   LYMPHOPCT 12* 14*   MONOPCT 5 6     BMP:  Recent Labs     10/22/20  0604 10/23/20  0818    139   K 4.6 4.4    105   CO2 21 23   BUN 23* 19   CREATININE 0.78 0.66*     Hepatic Function Panel:   Recent Labs     10/22/20  0604 10/23/20  0818   PROT 6.5 6.4   LABALBU 2.8* 2.9*   BILITOT 0.50 0.51   ALKPHOS 66 64   * 133*   AST 74* 44*     No results for input(s): RPR in the last 72 hours. No results for input(s): HIV in the last 72 hours. No results for input(s): BC in the last 72 hours. Lab Results   Component Value Date    CREATININE 0.66 10/23/2020    CREATININE 0.72 10/20/2020    GLUCOSE 130 10/23/2020    GLUCOSE 173 10/20/2020       Detailed results: Thank you for allowing us to participate in the care of this patient. Please call with questions. This note is created with the assistance of a speech recognition program.  While intending to generate adocument that actually reflects the content of the visit, the document can still have some errors including those of syntax and sound a like substitutions which may escape proof reading. It such instances, actual meaningcan be extrapolated by contextual diversion.     Jalyn Cadet MD  Office: [phone]  Perfect serve / office [phone]

## 2020-10-24 NOTE — PROGRESS NOTES
PULMONARY/CRITICAL CARE PROGRESS NOTE:  Patient's name:  Mendy Rios  Medical Record Number: [id number]  Patient's account/billing number: [de-identified]  Patient's YOB: 1965  Age: 60 y.o. Date of Admission: 10/20/2020 11:14 AM    I personally interviewed/examined the patient, reviewed interval history and interpreted all available radiographic, laboratory data at the time of service. REASON FOR ADMISSION:   COVID-19 pneumonia  Acute hypoxic respiratory failure    LOS: 4    ICU COURSE/INTERVAL HISTORY: 10/23/2020    Overnight events seen chart reviewed other notes seen  He remains hemodynamically stable, he is afebrile last 24 hours and T-max is 98.8 in the last 24 hours. He is gradually improving  Improving shortness of breath and cough  He denies diarrhea or vomiting and nausea. He is sleeping in prone position according to patient at night and denies any problem sleeping he was able to move out of bed to chair. Has been using oxygen by nasal cannula at 4 L/min  He is on Decadron, remdesivir IV, received convalescent plasma x1. PT SEEN AND CHART REVIEWED. The patient is a 60 y.o. male who was transferred from [facility] on 10/20/2020. He was admitted to [facility] on 10/19/2020 when he presented with almost 5 to 6 days history of fever associated with cough gradually worsening shortness of breath, diarrhea with some abdominal cramping without vomiting, headache myalgias body aches and was admitted to ICU there initially he was on nasal cannula and was having increasing requirement of oxygen and was transferred to [facility] on 10/19/2020. He initially went to clinic locally and his COVID-19 test was positive on 10/12/2020 per chart. He was having fever while he was in [facility] and started empirically on antibiotic, on Decadron for COVID-19 pneumonia. Since he was transferred here he did not have fever spike.   He was initially on nasal cannula but was having worsening hypoxia and this morning he was on 6 L nasal cannula then nonrebreather and developed more respiratory distress tachypnea and was placed on BiPAP and pulmonary service was consulted. On initial evaluation he was on BiPAP 14/7/50%, was maintaining saturation 92% on BiPAP  His initial lactic acid was 2.4 on 10/19/2020 which improved to 1.6 BUN and creatinine was normal electrolytes were normal.  AST today is 96 , WBC count today is 12.6 hemoglobin 14.4 hematocrit 43.8 platelet count 496. INR is 0.9 fibrinogen 620.   D-dimer is 0.46        REVIEW OF SYSTEMS -     CONSTITUTIONAL: Negative for  fevers, chills, fatigue, anorexia  EYES:  Negative for  double vision, blurred vision, dry eyes, eye discharge, visual disturbance, irritation, redness, and icterus  HEENT:  Negative for postnasal drip, nasal congestion, epistaxis tinnitus, ear drainage, earaches, sore throat, hoarseness, and voice change  RESPIRATORY: Positive for  dry cough, cough with sputum, dyspnea, negative for wheezing, hemoptysis, chest pain, and pleuritic pain  CARDIOVASCULAR:  Negative for  chest pain, palpitations, orthopnea, PND, exertional chest pressure/discomfort, edema, syncope  GASTROINTESTINAL: Positive for diarrhea, mild abdominal cramps, negative for nausea, vomiting,  constipation,  jaundice, dysphagia, reflux, odynophagia, hematemesis, and hemtochezia  GENITOURINARY:  Negative for frequency, dysuria, nocturia, urinary incontinence, hesitancy, decreased stream, and hematuria  HEMATOLOGIC/LYMPHATIC:  Negative for easy bruising, bleeding, lymphadenopathy, and petechiae  ALLERGIC/IMMUNOLOGIC:  Negative for recurrent infections, urticaria, hay fever, angioedema, anaphylaxis, and drug reactions  ENDOCRINE:  Negative for heat intolerance, cold intolerance, tremor, and weight changes  MUSCULOSKELETAL:  Negative for  myalgias, arthralgias, joint swelling, stiff joints, decreased range of motion, muscle weakness, and bone azithromycin  500 mg Intravenous Q24H    And    cefTRIAXone (ROCEPHIN) IV  1 g Intravenous Q24H    influenza virus vaccine  0.5 mL Intramuscular Once    enoxaparin  40 mg Subcutaneous BID    remdesivir IVPB  100 mg Intravenous Q24H     Continuous Infusions:  PRN Meds:loperamide, acetaminophen **OR** acetaminophen, acetaminophen **OR** acetaminophen, albuterol, magnesium hydroxide, nicotine, promethazine **OR** ondansetron, sodium chloride flush, sodium chloride, zolpidem, polyvinyl alcohol  VITAL SIGNS:  LAST:  BP (!) 155/73   Pulse 87   Temp 98.4 °F (36.9 °C)   Resp 24   Ht 5' 9\" (1.753 m)   Wt 224 lb 13.9 oz (102 kg)   SpO2 93%   BMI 33.21 kg/m²   8-24 HR RANGE:  TEMP Temp  Av.5 °F (36.9 °C)  Min: 98.1 °F (36.7 °C)  Max: 98.8 °F (75.5 °C)   BP Systolic (39IUK), BYF:336 , Min:100 , ROSANGELA:979      Diastolic (33PGG), NL, Min:60, Max:80     PULSE Pulse  Av.5  Min: 65  Max: 102   RR Resp  Av  Min: 22  Max: 24   O2 SAT SpO2  Av.5 %  Min: 93 %  Max: 94 %   OXYGEN DELIVERY O2 Flow Rate (L/min)  Av L/min  Min: 5 L/min  Max: 5 L/min        VENTILATOR SETTINGS:  Vent Information  Skin Assessment: Clean, dry, & intact  FiO2 : 50 %  SpO2: 93 %  SpO2/FiO2 ratio: 188  I Time/ I Time %: 0.9 s  Humidification Source: Heated wire  Humidification Temp: 33  Humidification Temp Measured: 33  Mask Type: Full face mask  Mask Size: Medium     PaO2/FiO2 RATIO:  No results for input(s): POCPO2 in the last 72 hours. FiO2 : 50 %     INPUT/OUTPUT:  In: -   Out: 400 [Urine:400]     LABS:  ABGs:   No results for input(s): POCPH, POCPCO2, POCPO2, POCHCO3, CQOS7PZR in the last 72 hours.   CBC:   Recent Labs     10/22/20  0604 10/23/20  0818 10/24/20  0558   WBC 12.6* 12.1* 13.6*   HGB 13.9 14.2 13.6   HCT 43.4 43.2 43.2   MCV 96.4 95.8 97.1   * 534* 498*   LYMPHOPCT 12* 14* 14*   RBC 4.50 4.51 4.45   MCH 30.9 31.5 30.6   MCHC 32.0 32.9 31.5   RDW 12.5 12.6 12.5     CRP:   Recent Labs     10/22/20  0604 CRP 38.5*     LDH:   Recent Labs     10/22/20  0604 10/23/20  0818   * 441*     BMP:   Recent Labs     10/22/20  0604 10/23/20  0818 10/24/20  0558    139 138   K 4.6 4.4 4.4    105 105   CO2 21 23 21   BUN 23* 19 18   CREATININE 0.78 0.66* 0.58*   GLUCOSE 161* 130* 108*   PHOS 4.4 3.9 2.9     Liver Function Test:   Recent Labs     10/22/20  0604 10/23/20  0818 10/24/20  0558   PROT 6.5 6.4 6.3*   LABALBU 2.8* 2.9* 2.7*   * 133* 104*   AST 74* 44* 38   ALKPHOS 66 64 60   BILITOT 0.50 0.51 0.43     Coagulation Profile:   Recent Labs     10/22/20  0604 10/23/20  0818 10/24/20  0558   INR 1.0 0.9 1.0   PROTIME 10.0 9.8 10.1   APTT 19.3* 21.7 22.1     D-Dimer:  Recent Labs     10/22/20  0604 10/23/20  0818 10/24/20  0558   DDIMER 0.59 0.44 0.47     Ferritin:    Recent Labs     10/22/20  0604 10/23/20  0818 10/24/20  0558   FERRITIN 1,784* 1,036* 827*     Lactic Acid:  No results for input(s): LACTA in the last 72 hours. Cardiac Enzymes:  Recent Labs     10/22/20  0604 10/23/20  0818 10/24/20  0558   CKTOTAL 100 72 92     BNP/ProBNP:   Recent Labs     10/22/20  0604   PROBNP 78     Triglycerides:  No results for input(s): TRIG in the last 72 hours. QTc:     RADIOLOGY:  XR CHEST (SINGLE VIEW FRONTAL)   Final Result   Subtle left lung opacities are nonspecific but could represent pneumonia. Right infrahilar opacity may represent atelectasis versus pneumonia. ASSESSMENT:     Acute hypoxic respiratory failure secondary to COVID-19 pneumonia   Acute respiratory distress syndrome   Bilateral multifocal pneumonia due to COVID 19 infection   Sepsis due to COVID-19.    History of hypertension   History of renal calculi   Obesity/possible obstructive sleep apnea   Hepatic transaminase elevation, improving   Thrombocytosis, improving  PLAN:       We will use nasal cannula 5 to 6 L and alternate with high flow nasal cannula if needed if you desaturate less than 90% we will continue with high flow nasal cannula.  Discussed with nursing staff to have BiPAP at night and intermittently during the daytime as needed or if distress.  Continue with prone positioning at night and also intermittently during the daytime.  Monitor oxygenation, work of breathing and respiratory symptoms.  Will need intermittent Lasix.  Continue with IV remdesivir.  Continue with Decadron.  Received convalescent plasma 10/20/2020.  Monitor BMP, Intake/Output with a goal of even/negative fluid balance   Continue with airborne and droplet isolation.  Monitor inflammatory marker ferritin D-dimer LDH fibrinogen LFTs CK and CRP periodically   Obtain X-ray chest as needed    Continue pulmonary toilet, aspiration precautions and bronchodilators   Continue to monitor CBC, coagulation profile   Chemical DVT prophylaxis to continue with Lovenox   Antimicrobials reviewed; on Rocephin and Zithromax per infectious disease   Physical/occupational therapy  His oxygenation is improving and overall improved did not require BiPAP respiratory distress improving and okay to transfer to stepdown unit with careful observation    Discussed with nursing staff, treatment plan discussed with  Discussed with respiratory therapist.    Patient remains critically ill with illness/injury that acutely impairs one or more vital organ systems, such that there is a high probability of imminent or life threatening deterioration in the patient's condition. Critical care time of greater than 30 minutes was spent (excluding procedures), in coordination of care during rounds and discussion of patient care in detail, and recommendations of the team members were adopted in the plan. Necessity of all invasive devices was also confirmed. Stormy Andrews M.D.    Pulmonary and Critical Care Medicine           10/24/2020, 8:51 AM    This patient was evaluated in the context of the global SARS-CoV-2 (COVID-19) pandemic, which necessitated considerations that the patient either has COVID-19 infection or is at risk of infection with COVID-19. Institutional protocols and algorithms that pertain to the evaluation & management of patients with COVID-19 or those at risk for COVID-19 are in a state of rapid changes based on information released by regulatory bodies including the CDC and federal and state organizations. These policies and algorithms were followed during the patient's care. Please note that this chart was generated using voice recognition Dragon dictation software. Although every effort was made to ensure the accuracy of this automated transcription, some errors in transcription may have occurred.   Walk

## 2020-10-24 NOTE — PROGRESS NOTES
[facility]  Office: [address], DO, Patricia Hammondy, DO, Silas Signs, DO, Radha Casey Blood, DO, Luzma Phipps MD, Mignon Meckel, MD, Lisandra Powers MD, Rocio Dickens MD, Humaira Cline MD, Oleg Flower MD, Julia Guerrero MD, Randy Mcnally MD, Ranjit Gavin MD, Rosario Nova, DO, Emma Hadley MD, Helio Walsh MD, Brandy Livingston, DO, Sidney Kilgore MD,  Navin Pedro, DO, Mimi Cotter MD, Sarah Orona MD, Ashley Paige, Jam Houston, CNP, Hazel Yee, CNP, Anupam Brandt, CNS, Kajal Awad, CNP, Елена Carbone, CNP, Arsenio Godoy, CNP, Salomon Pruett, CNP, Silvestre Mccarthy, CNP, Eleanor Taylor PA-C, Natalia Iverson, [place], Maeve Stephens, CNP, Izabela Prater, CNP, Anthony Blanco, CNP, Suzy Platt, CNP, Caryle Poet, [address] [place] [address]    Progress Note    10/24/2020    1:27 PM    Name:   Rena Ni  MRN:     [id number]     Acct:      [de-identified]   Room:   [facility] Day:  4  Admit Date:  10/20/2020 11:14 AM    PCP:   Darline Caballero MD  Code Status:  Full Code    Subjective:     C/C: No chief complaint on file. SOB  Interval History Status: worsened.      Seen and examined face-to-face,  Discussed with pulmonary,  Infectious disease on board,  Shortness of breath little   On 5L via NC now    Brief History:   A 57-year-old gentleman with underlying history of hypertension presented with increased shortness of breath which started few days back, ended up into the ER with increasing need of oxygen, his shortness of breath and cough started on October 9, tested positive for Covid on October 12 subsequently got worse and more short of breath and went to the local ER on October 19 with diffuse Covid related pneumonia, needing oxygen, D-dimer was 820, CT of the chest did not show any PE but bilateral groundglass multifocal pneumonia consistent with Covid 19 pneumonia.,  Transferred to [facility] for further management,  Has already been seen by infectious disease and started on remdesivir, patient already started feeling better    October 22,  Acute respiratory failure,  Sepsis due to Covid pneumonia  Covid pneumonia,  Hypertension,  Obesity,  History of smoking,  Shortness of breath that has been improving today,  Patient on remdesivir, already got the plasma, Decadron    October 23,  Acute respiratory failure,  Sepsis due to Covid pneumonia  Covid pneumonia,  Hypertension,  Obesity,  History of smoking,  Shortness of breath that has been improving   Patient on remdesivir, already got the plasma, Decadron    Oct 24,  Acute respiratory failure, on 5L via NC  Sepsis due to Covid pneumonia  Covid pneumonia,  Hypertension,  Obesity,  History of smoking,  Shortness of breath that has been improving   Patient on remdesivir, already got the plasma, Decadron,      Review of Systems:     Constitutional:  negative for chills, fevers, sweats  Respiratory: Shortness of breath  Cardiovascular:  negative for chest pain, chest pressure/discomfort, lower extremity edema, palpitations  Gastrointestinal:  negative for abdominal pain, constipation, diarrhea, nausea, vomiting  Neurological:  negative for dizziness, headache    Medications: Allergies:     Allergies   Allergen Reactions    No Known Allergies        Current Meds:   Scheduled Meds:    dexamethasone  6 mg Oral Daily    sodium chloride flush  10 mL Intravenous 2 times per day    azithromycin  500 mg Intravenous Q24H    And    cefTRIAXone (ROCEPHIN) IV  1 g Intravenous Q24H    influenza virus vaccine  0.5 mL Intramuscular Once    enoxaparin  40 mg Subcutaneous BID    remdesivir IVPB  100 mg Intravenous Q24H     Continuous Infusions:   PRN Meds: loperamide, acetaminophen **OR** acetaminophen, acetaminophen **OR** acetaminophen, albuterol, magnesium hydroxide, nicotine, promethazine **OR** ondansetron, sodium chloride flush, sodium chloride, zolpidem, polyvinyl alcohol    Data:     Past Medical History:   has a past medical history of Hypertension and Patient in clinical research study. Social History:   reports that he has quit smoking. He quit after 20.00 years of use. He has never used smokeless tobacco. He reports that he does not drink alcohol or use drugs. Family History: History reviewed. No pertinent family history. Vitals:  BP (!) 148/51   Pulse 83   Temp 97.3 °F (36.3 °C) (Axillary)   Resp 22   Ht 5' 9\" (1.753 m)   Wt 224 lb 13.9 oz (102 kg)   SpO2 92%   BMI 33.21 kg/m²   Temp (24hrs), Av.2 °F (36.8 °C), Min:97.3 °F (36.3 °C), Max:98.8 °F (37.1 °C)    Recent Labs     10/21/20  1748 10/22/20  0854   POCGLU 112* 124*       I/O (24Hr):     Intake/Output Summary (Last 24 hours) at 10/24/2020 1327  Last data filed at 10/24/2020 1215  Gross per 24 hour   Intake 960 ml   Output 800 ml   Net 160 ml       Labs:  Hematology:  Recent Labs     10/22/20  0604 10/23/20  0818 10/24/20  0558   WBC 12.6* 12.1* 13.6*   RBC 4.50 4.51 4.45   HGB 13.9 14.2 13.6   HCT 43.4 43.2 43.2   MCV 96.4 95.8 97.1   MCH 30.9 31.5 30.6   MCHC 32.0 32.9 31.5   RDW 12.5 12.6 12.5   * 534* 498*   MPV 8.7 8.5 9.1   CRP 38.5*  --  53.0*   INR 1.0 0.9 1.0   DDIMER 0.59 0.44 0.47     Chemistry:  Recent Labs     10/22/20  0604 10/23/20  0818 10/24/20  0558    139 138   K 4.6 4.4 4.4    105 105   CO2 21 23 21   GLUCOSE 161* 130* 108*   BUN 23* 19 18   CREATININE 0.78 0.66* 0.58*   ANIONGAP 12 11 12   LABGLOM >60 >60 >60   GFRAA >60 >60 >60   CALCIUM 8.5* 8.4* 8.4*   PHOS 4.4 3.9 2.9   PROBNP 78  --   --    TROPHS <6  --   --    CKTOTAL 100 72 92     Recent Labs     10/21/20  1748 10/22/20  0604 10/22/20  0854 10/23/20  0818 10/24/20  0558   PROT  --  6.5  --  6.4 6.3*   LABALBU  --  2.8*  --  2.9* 2.7*   AST  --  74*  --  44* 38   ALT  --  155*  --  133* 104*   LDH  --  783*  --  441* 430*   ALKPHOS  --  66  --  64 60   BILITOT  --  0.50  --  0.51 0.43   URICACID  -- 6.0  --  5.5 5.4   POCGLU 112*  --  124*  --   --      ABG:No results found for: POCPH, PHART, PH, POCPCO2, TOD3AWV, PCO2, POCPO2, PO2ART, PO2, POCHCO3, WKL3LET, HCO3, NBEA, PBEA, BEART, BE, THGBART, THB, GRT4CVG, FOQZ5VOQ, P9PORPQX, O2SAT, FIO2  Lab Results   Component Value Date/Time    SPECIAL NOT REPORTED 10/22/2020 12:00 PM     No results found for: CULTURE    Radiology:  Xr Chest (single View Frontal)    Result Date: 10/21/2020  Subtle left lung opacities are nonspecific but could represent pneumonia. Right infrahilar opacity may represent atelectasis versus pneumonia. Cta Chest W Contrast    Result Date: 10/19/2020  1. Study degraded by motion. 2. No evidence of central PE or secondary signs of PE. 3. Multilobar groundglass opacities in a peripheral distribution raises suspicion of multilobar pneumonia, potentially of viral etiology. Physical Examination:        General appearance:  alert, cooperative and no distress  Mental Status:  oriented to person, place and time and normal affect  Lungs: Bilateral coarse breath sounds, basal crackles  Heart:  regular rate and rhythm, no murmur  Abdomen:  soft, nontender, nondistended, normal bowel sounds, no masses, hepatomegaly, splenomegaly  Extremities:  no edema, redness, tenderness in the calves  Skin:  no gross lesions, rashes, induration    Assessment:        Hospital Problems           Last Modified POA    * (Principal) Acute respiratory failure with hypoxia ([address] 10/20/2020 Yes    HTN (hypertension), benign 10/20/2020 Yes    Pneumonia due to COVID-19 virus 10/20/2020 Yes    Sepsis due to COVID-19 ([address] 10/20/2020 Yes    Primary insomnia 10/20/2020 Yes          Plan:        1. Covid pneumonia, acute respiratory failure, sepsis, infectious disease on board, pulmonary on board, on 5L ox via NC,   2. ID started the patient on remdesivir, convalescent plasma given, Decadron,  3. Monitor labs,  4. Ambien for insomnia,  5. DVT prophylaxis,  6.  Full CODE Per Goetz MD  10/24/2020  1:27 PM

## 2020-10-24 NOTE — PROGRESS NOTES
Pts sister called. Updated with all questions and concerns answered at this time.     Electronically signed by Karen Castro RN on 10/24/2020 at 10:05 AM

## 2020-10-24 NOTE — PLAN OF CARE
Problem: Skin Integrity:  Goal: Will show no infection signs and symptoms  Description: Will show no infection signs and symptoms  10/24/2020 1733 by Yomi Bain RN  Outcome: Met This Shift  10/24/2020 0605 by Pino Lubin RN  Outcome: Ongoing  Goal: Absence of new skin breakdown  Description: Absence of new skin breakdown  10/24/2020 1733 by Yomi Bain RN  Outcome: Met This Shift  10/24/2020 0605 by Pino Lubin RN  Outcome: Ongoing     Problem: Falls - Risk of:  Goal: Will remain free from falls  Description: Will remain free from falls  10/24/2020 1733 by Yomi Bain RN  Outcome: Met This Shift  10/24/2020 0605 by Pino Lubin RN  Outcome: Ongoing  Goal: Absence of physical injury  Description: Absence of physical injury  10/24/2020 1733 by Yomi Bain RN  Outcome: Met This Shift  10/24/2020 0605 by Pino Lubin RN  Outcome: Ongoing     Problem: Airway Clearance - Ineffective  Goal: Achieve or maintain patent airway  10/24/2020 1733 by Yomi Bain RN  Outcome: Ongoing  10/24/2020 0605 by Pino Lubin RN  Outcome: Ongoing     Problem: Gas Exchange - Impaired  Goal: Absence of hypoxia  10/24/2020 1733 by Yomi Bain RN  Outcome: Ongoing  10/24/2020 0605 by Pino Lubin RN  Outcome: Ongoing  Goal: Promote optimal lung function  10/24/2020 1733 by Yomi Bain RN  Outcome: Ongoing  10/24/2020 0605 by Pino Lubin RN  Outcome: Ongoing     Problem: Breathing Pattern - Ineffective  Goal: Ability to achieve and maintain a regular respiratory rate  10/24/2020 1733 by Yomi Bain RN  Outcome: Ongoing  10/24/2020 0605 by Pino Lubin RN  Outcome: Ongoing     Problem:  Body Temperature -  Risk of, Imbalanced  Goal: Ability to maintain a body temperature within defined limits  10/24/2020 1733 by Yomi Bain RN  Outcome: Ongoing  10/24/2020 0605 by Pino Lubin RN  Outcome: Ongoing  Goal: Will regain or maintain usual level of consciousness  10/24/2020 1733 by Yomi Bain RN  Outcome: Ongoing  10/24/2020 0605 by Khoa Velazquez RN  Outcome: Ongoing  Goal: Complications related to the disease process, condition or treatment will be avoided or minimized  10/24/2020 1733 by Edwige Hutchinson RN  Outcome: Ongoing  10/24/2020 0605 by Khoa Velazquez RN  Outcome: Ongoing     Problem: Isolation Precautions - Risk of Spread of Infection  Goal: Prevent transmission of infection  10/24/2020 1733 by Edwige Hutchinson RN  Outcome: Ongoing  10/24/2020 0605 by Khoa Velazquez RN  Outcome: Ongoing     Problem: Nutrition Deficits  Goal: Optimize nutrtional status  10/24/2020 1733 by Ediwge Hutchinson RN  Outcome: Ongoing  10/24/2020 0605 by Khoa Velazquez RN  Outcome: Ongoing     Problem: Risk for Fluid Volume Deficit  Goal: Maintain normal heart rhythm  10/24/2020 1733 by Edwige Hutchinson RN  Outcome: Ongoing  10/24/2020 0605 by Khoa Velazquez RN  Outcome: Ongoing  Goal: Maintain absence of muscle cramping  10/24/2020 1733 by Edwige Hutchinson RN  Outcome: Ongoing  10/24/2020 0605 by Khoa Velazquez RN  Outcome: Ongoing  Goal: Maintain normal serum potassium, sodium, calcium, phosphorus, and pH  10/24/2020 1733 by Edwige Hutchinson RN  Outcome: Ongoing  10/24/2020 0605 by Khoa Velazquez RN  Outcome: Ongoing     Problem: Loneliness or Risk for Loneliness  Goal: Demonstrate positive use of time alone when socialization is not possible  10/24/2020 1733 by Edwige Hutchinson RN  Outcome: Ongoing  10/24/2020 0605 by Khoa Velazquez RN  Outcome: Ongoing     Problem: Fatigue  Goal: Verbalize increase energy and improved vitality  10/24/2020 1733 by Edwige Hutchinson RN  Outcome: Ongoing  10/24/2020 0605 by Khoa Velazquez RN  Outcome: Ongoing     Problem: Patient Education: Go to Patient Education Activity  Goal: Patient/Family Education  10/24/2020 1733 by Edwige Hutchinson RN  Outcome: Ongoing  10/24/2020 0605 by Khoa Velazquez RN  Outcome: Ongoing     Problem: Nutrition  Goal: Optimal nutrition therapy  Description: Nutrition Problem #1: Predicted inadequate energy intake  Intervention: Food and/or Nutrient Delivery: Continue Current Diet, Start Oral Nutrition Supplement  Nutritional Goals: Pt to conusme >75% of est'd daily needs via PO     10/24/2020 1733 by Laith Sadler RN  Outcome: Ongoing  10/24/2020 0605 by Susan Hadley RN  Outcome: Ongoing

## 2020-10-24 NOTE — PROGRESS NOTES
[facility], [place] -   Infectious diseases evaluation  admission date 10/20/2020    reason for consultation:   covid    Impression :   Current:  · covid diffuse illness  X 10/9/20  · Diffuse covid pneumonia   · Sepsis from covid  · lymphopenia  · elevated d-dimer 800  · transaminitis from covid    Other:  ·   Discussion / summary of stay / plan of care   ·   Recommendations   · Remdesevir x 5 days till 10/24  · Watch LFT  · Steroids and lovenox started 10/19  · Watch inflamm markers  · 1 Unit of plasma - pt consented 10/20/20, completed 10/20 5:30 PM  · Did not fit criteria for nitric oxide,  · Signed for the genetic study  · In general starting to stabilize  Patient comforted    Infection Control Recommendations   · [place] Precautions  · Contact Isolation   · Airborne isolation  · Droplet Isolation      Antimicrobial Stewardship Recommendations   · Simplification of therapy  · Targeted therapy    Coordination ofOutpatient Care:   · Estimated Length of IV antimicrobials:  · Patient will need Midline / picc Catheter Insertion:   · Patient will need SNF:  · Patient will need outpatient wound care:     History of Present Illness:   Initial history:  Cheryl Burleson is a 60y.o.-year-old male w cough and sob x 10/9 and tested + covid 10/12- got worse and more SOB and went to [facility] hospital 10/19 w diffuse covid . pniu and needing O2, D-dimer 820, transferred to us this am, liver enz elevated and lymphopenia, lost wet x ;ast week due to diarrhea, poor appetite till today when he started eating. Fever and dry cough, SOB and diarrhea, no rash, were his symptoms  No sore throat and no poor smell of the food. Liver enz     Interval changes  10/24/2020   Due to the current efforts to prevent transmission of COVID-19 and also the need to preserve PPE for other caregivers, a face-to-face encounter with the patient was not performed.  That being said, all relevant records and diagnostic tests were reviewed, including laboratory results and imaging. Patient was evaluated from the window, called on the phone, and chart reviewed, disc w  involved healthcare workers. No fever - walking w,less SOB - diarrhea better - in general happy to be better  LFT normal    He signed for the [place] study  Was not a candidate for nitric oxide at this time  Received the immune plasma at 5/30/2010/20, tolerated it well    Summary of relevant labs:  Labs:  WBC 11-12  ALC 0.63    [facility]        Creat normal  D-Dimer 820  Fibrinogen 620    Micro:  covid + [facility] 10/12  Imaging:  CXR 10/21  Subtle left lung opacities are nonspecific but could represent pneumonia.         Right infrahilar opacity may represent atelectasis versus pneumonia         CT chest 10/19 multifocal ground glass covid like pneumonia       I have personally reviewed the past medical history, past surgical history, medications, social history, and family history, and I haveupdated the database accordingly.   Past Medical History:     Past Medical History:   Diagnosis Date    Hypertension     Patient in clinical research study 10/22/2020    Enrolled in [employer] [facility] expected completion 12/22/2020       Past Surgical  History:     Past Surgical History:   Procedure Laterality Date    LITHOTRIPSY      x4 all together       Medications:      dexamethasone  6 mg Oral Daily    sodium chloride flush  10 mL Intravenous 2 times per day    azithromycin  500 mg Intravenous Q24H    And    cefTRIAXone (ROCEPHIN) IV  1 g Intravenous Q24H    influenza virus vaccine  0.5 mL Intramuscular Once    enoxaparin  40 mg Subcutaneous BID    remdesivir IVPB  100 mg Intravenous Q24H       Social History:     Social History     Socioeconomic History    Marital status: Unknown     Spouse name: Not on file    Number of children: Not on file    Years of education: Not on file    Highest education level: immunocompromised state. Neurological: Negative for facial asymmetry. Hematological: Negative for adenopathy. Psychiatric/Behavioral: Negative for agitation. Physical Examination :     Patient Vitals for the past 8 hrs:   BP Temp Temp src Pulse Resp SpO2   10/24/20 1215 (!) 148/51 97.3 °F (36.3 °C) Axillary 83 22 92 %   10/24/20 0830 (!) 155/73 98.4 °F (36.9 °C) Oral 87 24 93 %       Physical Exam  Constitutional:       General: He is not in acute distress. Appearance: He is normal weight. He is not ill-appearing. HENT:      Head: Normocephalic and atraumatic. Nose: No congestion. Mouth/Throat:      Mouth: Mucous membranes are moist.   Eyes:      General: No scleral icterus. Conjunctiva/sclera: Conjunctivae normal.      Pupils: Pupils are equal, round, and reactive to light. Pulmonary:      Effort: No respiratory distress. Abdominal:      General: There is no distension. Palpations: Abdomen is soft. Genitourinary:     Comments: No sands  Musculoskeletal:         General: No swelling or deformity. Right lower leg: No edema. Left lower leg: No edema. Skin:     General: Skin is dry. Coloration: Skin is not jaundiced or pale. Neurological:      General: No focal deficit present. Mental Status: He is alert and oriented to person, place, and time. Cranial Nerves: No cranial nerve deficit. Sensory: No sensory deficit. Psychiatric:         Mood and Affect: Mood normal.         Thought Content:  Thought content normal.           Medical Decision Making:   I have independently reviewed/ordered the following labs:    CBC with Differential:   Recent Labs     10/23/20  0818 10/24/20  0558   WBC 12.1* 13.6*   HGB 14.2 13.6   HCT 43.2 43.2   * 498*   LYMPHOPCT 14* 14*   MONOPCT 6 7     BMP:  Recent Labs     10/23/20  0818 10/24/20  0558    138   K 4.4 4.4    105   CO2 23 21   BUN 19 18   CREATININE 0.66* 0.58*     Hepatic Function Panel:   Recent Labs     10/23/20  0818 10/24/20  0558   PROT 6.4 6.3*   LABALBU 2.9* 2.7*   BILITOT 0.51 0.43   ALKPHOS 64 60   * 104*   AST 44* 38     No results for input(s): RPR in the last 72 hours. No results for input(s): HIV in the last 72 hours. No results for input(s): BC in the last 72 hours. Lab Results   Component Value Date    CREATININE 0.58 10/24/2020    CREATININE 0.72 10/20/2020    GLUCOSE 108 10/24/2020    GLUCOSE 173 10/20/2020       Detailed results: Thank you for allowing us to participate in the care of this patient. Please call with questions. This note is created with the assistance of a speech recognition program.  While intending to generate adocument that actually reflects the content of the visit, the document can still have some errors including those of syntax and sound a like substitutions which may escape proof reading. It such instances, actual meaningcan be extrapolated by contextual diversion.     Laura Aiken MD  Office: [phone]  Perfect serve / office [phone]

## 2020-10-25 LAB
ABSOLUTE EOS #: 0.54 K/UL (ref 0–0.4)
ABSOLUTE IMMATURE GRANULOCYTE: 0.27 K/UL (ref 0–0.3)
ABSOLUTE LYMPH #: 2.57 K/UL (ref 1–4.8)
ABSOLUTE MONO #: 1.22 K/UL (ref 0.1–0.8)
ALBUMIN SERPL-MCNC: 2.7 G/DL (ref 3.5–5.2)
ALBUMIN/GLOBULIN RATIO: 0.8 (ref 1–2.5)
ALP BLD-CCNC: 62 U/L (ref 40–129)
ALT SERPL-CCNC: 109 U/L (ref 5–41)
ANION GAP SERPL CALCULATED.3IONS-SCNC: 13 MMOL/L (ref 9–17)
AST SERPL-CCNC: 43 U/L
BASOPHILS # BLD: 0 % (ref 0–2)
BASOPHILS ABSOLUTE: 0 K/UL (ref 0–0.2)
BILIRUB SERPL-MCNC: 0.45 MG/DL (ref 0.3–1.2)
BUN BLDV-MCNC: 17 MG/DL (ref 6–20)
BUN/CREAT BLD: ABNORMAL (ref 9–20)
CALCIUM SERPL-MCNC: 8.2 MG/DL (ref 8.6–10.4)
CHLORIDE BLD-SCNC: 104 MMOL/L (ref 98–107)
CO2: 21 MMOL/L (ref 20–31)
CREAT SERPL-MCNC: 0.61 MG/DL (ref 0.7–1.2)
D-DIMER QUANTITATIVE: 0.64 MG/L FEU
DIFFERENTIAL TYPE: ABNORMAL
EOSINOPHILS RELATIVE PERCENT: 4 % (ref 1–4)
FIBRINOGEN: 628 MG/DL (ref 140–420)
GFR AFRICAN AMERICAN: >60 ML/MIN
GFR NON-AFRICAN AMERICAN: >60 ML/MIN
GFR SERPL CREATININE-BSD FRML MDRD: ABNORMAL ML/MIN/{1.73_M2}
GFR SERPL CREATININE-BSD FRML MDRD: ABNORMAL ML/MIN/{1.73_M2}
GLUCOSE BLD-MCNC: 101 MG/DL (ref 70–99)
HCT VFR BLD CALC: 43 % (ref 40.7–50.3)
HEMOGLOBIN: 13.9 G/DL (ref 13–17)
IMMATURE GRANULOCYTES: 2 %
INR BLD: 0.9
LYMPHOCYTES # BLD: 19 % (ref 24–44)
MCH RBC QN AUTO: 30.6 PG (ref 25.2–33.5)
MCHC RBC AUTO-ENTMCNC: 32.3 G/DL (ref 28.4–34.8)
MCV RBC AUTO: 94.7 FL (ref 82.6–102.9)
MONOCYTES # BLD: 9 % (ref 1–7)
MORPHOLOGY: NORMAL
NRBC AUTOMATED: 0 PER 100 WBC
PARTIAL THROMBOPLASTIN TIME: 23.5 SEC (ref 20.5–30.5)
PDW BLD-RTO: 12.6 % (ref 11.8–14.4)
PLATELET # BLD: 601 K/UL (ref 138–453)
PLATELET ESTIMATE: ABNORMAL
PMV BLD AUTO: 8.8 FL (ref 8.1–13.5)
POTASSIUM SERPL-SCNC: 4.5 MMOL/L (ref 3.7–5.3)
PROTHROMBIN TIME: 9.9 SEC (ref 9–12)
RBC # BLD: 4.54 M/UL (ref 4.21–5.77)
RBC # BLD: ABNORMAL 10*6/UL
SEG NEUTROPHILS: 66 % (ref 36–66)
SEGMENTED NEUTROPHILS ABSOLUTE COUNT: 8.9 K/UL (ref 1.8–7.7)
SODIUM BLD-SCNC: 138 MMOL/L (ref 135–144)
TOTAL PROTEIN: 6.3 G/DL (ref 6.4–8.3)
WBC # BLD: 13.5 K/UL (ref 3.5–11.3)
WBC # BLD: ABNORMAL 10*3/UL

## 2020-10-25 PROCEDURE — 6370000000 HC RX 637 (ALT 250 FOR IP): Performed by: INTERNAL MEDICINE

## 2020-10-25 PROCEDURE — 80053 COMPREHEN METABOLIC PANEL: CPT

## 2020-10-25 PROCEDURE — 6360000002 HC RX W HCPCS: Performed by: INTERNAL MEDICINE

## 2020-10-25 PROCEDURE — 36415 COLL VENOUS BLD VENIPUNCTURE: CPT

## 2020-10-25 PROCEDURE — 85610 PROTHROMBIN TIME: CPT

## 2020-10-25 PROCEDURE — 6360000002 HC RX W HCPCS: Performed by: NURSE PRACTITIONER

## 2020-10-25 PROCEDURE — 85730 THROMBOPLASTIN TIME PARTIAL: CPT

## 2020-10-25 PROCEDURE — 99291 CRITICAL CARE FIRST HOUR: CPT | Performed by: INTERNAL MEDICINE

## 2020-10-25 PROCEDURE — 99232 SBSQ HOSP IP/OBS MODERATE 35: CPT | Performed by: INTERNAL MEDICINE

## 2020-10-25 PROCEDURE — 2060000000 HC ICU INTERMEDIATE R&B

## 2020-10-25 PROCEDURE — 85025 COMPLETE CBC W/AUTO DIFF WBC: CPT

## 2020-10-25 PROCEDURE — 2580000003 HC RX 258: Performed by: NURSE PRACTITIONER

## 2020-10-25 PROCEDURE — 85384 FIBRINOGEN ACTIVITY: CPT

## 2020-10-25 PROCEDURE — 85379 FIBRIN DEGRADATION QUANT: CPT

## 2020-10-25 RX ADMIN — ENOXAPARIN SODIUM 40 MG: 40 INJECTION SUBCUTANEOUS at 07:50

## 2020-10-25 RX ADMIN — DEXAMETHASONE 6 MG: 4 TABLET ORAL at 07:50

## 2020-10-25 RX ADMIN — CEFTRIAXONE SODIUM 1 G: 1 INJECTION, POWDER, FOR SOLUTION INTRAMUSCULAR; INTRAVENOUS at 19:57

## 2020-10-25 RX ADMIN — SODIUM CHLORIDE, PRESERVATIVE FREE 10 ML: 5 INJECTION INTRAVENOUS at 07:51

## 2020-10-25 RX ADMIN — BENZONATATE 100 MG: 100 CAPSULE ORAL at 23:43

## 2020-10-25 RX ADMIN — BENZONATATE 100 MG: 100 CAPSULE ORAL at 16:37

## 2020-10-25 RX ADMIN — BENZONATATE 100 MG: 100 CAPSULE ORAL at 06:09

## 2020-10-25 RX ADMIN — Medication 500 MG: at 19:57

## 2020-10-25 RX ADMIN — ENOXAPARIN SODIUM 40 MG: 40 INJECTION SUBCUTANEOUS at 19:57

## 2020-10-25 ASSESSMENT — ENCOUNTER SYMPTOMS
EYE ITCHING: 0
WHEEZING: 0
EYE REDNESS: 0
ANAL BLEEDING: 0
COUGH: 1
EYE PAIN: 0
COLOR CHANGE: 0
SHORTNESS OF BREATH: 0
CHEST TIGHTNESS: 0
PHOTOPHOBIA: 0
DIARRHEA: 1

## 2020-10-25 NOTE — PROGRESS NOTES
PULMONARY/CRITICAL CARE PROGRESS NOTE:  Patient's name:  Srinivas Murry  Medical Record Number: [id number]  Patient's account/billing number: [de-identified]  Patient's YOB: 1965  Age: 60 y.o. Date of Admission: 10/20/2020 11:14 AM    I personally interviewed/examined the patient, reviewed interval history and interpreted all available radiographic, laboratory data at the time of service. REASON FOR ADMISSION:   COVID-19 pneumonia  Acute hypoxic respiratory failure    LOS: 5    ICU COURSE/INTERVAL HISTORY: 10/23/2020    Overnight events seen chart reviewed other notes seen  He remains hemodynamically stable, he is afebrile last 24 hours and T-max is 99.1 in the last 24 hours. He is gradually improving  Improving shortness of breath and cough  He denies diarrhea or vomiting and nausea. He is sleeping in prone position according to patient at night and denies any problem sleeping he was able to move out of bed to chair. Has been using oxygen by nasal cannula at 2 L/min  He has completed remdesivir and received convalescent plasma x1. On Decadron    PT SEEN AND CHART REVIEWED. The patient is a 60 y.o. male who was transferred from [facility] on 10/20/2020. He was admitted to [facility] on 10/19/2020 when he presented with almost 5 to 6 days history of fever associated with cough gradually worsening shortness of breath, diarrhea with some abdominal cramping without vomiting, headache myalgias body aches and was admitted to ICU there initially he was on nasal cannula and was having increasing requirement of oxygen and was transferred to [place] on 10/19/2020. He initially went to clinic locally and his COVID-19 test was positive on 10/12/2020 per chart. He was having fever while he was in [facility] and started empirically on antibiotic, on Decadron for COVID-19 pneumonia. Since he was transferred here he did not have fever spike.   He was initially on nasal cannula but was having worsening hypoxia and this morning he was on 6 L nasal cannula then nonrebreather and developed more respiratory distress tachypnea and was placed on BiPAP and pulmonary service was consulted. On initial evaluation he was on BiPAP 14/7/50%, was maintaining saturation 92% on BiPAP  His initial lactic acid was 2.4 on 10/19/2020 which improved to 1.6 BUN and creatinine was normal electrolytes were normal.  AST today is 96 , WBC count today is 12.6 hemoglobin 14.4 hematocrit 43.8 platelet count 454. INR is 0.9 fibrinogen 620.   D-dimer is 0.46        REVIEW OF SYSTEMS -     CONSTITUTIONAL: Negative for  fevers, chills, fatigue, anorexia  EYES:  Negative for  double vision, blurred vision, dry eyes, eye discharge, visual disturbance, irritation, redness, and icterus  HEENT:  Negative for postnasal drip, nasal congestion, epistaxis tinnitus, ear drainage, earaches, sore throat, hoarseness, and voice change  RESPIRATORY: Positive for  dry cough, cough with sputum, dyspnea, negative for wheezing, hemoptysis, chest pain, and pleuritic pain  CARDIOVASCULAR:  Negative for  chest pain, palpitations, orthopnea, PND, exertional chest pressure/discomfort, edema, syncope  GASTROINTESTINAL: Positive for diarrhea, mild abdominal cramps, negative for nausea, vomiting,  constipation,  jaundice, dysphagia, reflux, odynophagia, hematemesis, and hemtochezia  GENITOURINARY:  Negative for frequency, dysuria, nocturia, urinary incontinence, hesitancy, decreased stream, and hematuria  HEMATOLOGIC/LYMPHATIC:  Negative for easy bruising, bleeding, lymphadenopathy, and petechiae  ALLERGIC/IMMUNOLOGIC:  Negative for recurrent infections, urticaria, hay fever, angioedema, anaphylaxis, and drug reactions  ENDOCRINE:  Negative for heat intolerance, cold intolerance, tremor, and weight changes  MUSCULOSKELETAL:  Negative for  myalgias, arthralgias, joint swelling, stiff joints, decreased range of motion, muscle weakness, and bone pain  NEUROLOGICAL: Positive for headaches, negative for dizziness, seizures, memory problems, speech problems, visual disturbance, coordination problems, gait problems, tremor, dysphagia, weakness, numbness, syncope, and tingling  BEHAVIOR/PSYCH: Positive for decreased energy level, decreased concentration, anxiety           Physical Exam:    VITAL SIGNS:  LAST:  /64   Pulse 84   Temp 98.4 °F (36.9 °C) (Oral)   Resp 25   Ht 5' 9\" (1.753 m)   Wt 224 lb 13.9 oz (102 kg)   SpO2 93%   BMI 33.21 kg/m²   8-24 HR RANGE:  TEMP Temp  Av.6 °F (37 °C)  Min: 98.3 °F (36.8 °C)  Max: 99.1 °F (20.4 °C)   BP Systolic (03AEL), XRX:830 , Min:102 , LKM:256      Diastolic (42BQH), RVK:39, Min:55, Max:91     PULSE Pulse  Av.8  Min: 79  Max: 97   RR Resp  Av  Min: 25  Max: 25   O2 SAT SpO2  Av.5 %  Min: 93 %  Max: 96 %   OXYGEN DELIVERY O2 Flow Rate (L/min)  Av L/min  Min: 2 L/min  Max: 2 L/min        INPUT/OUTPUT:  In: 850 [P.O.:500; I.V.:350]  Out: 800 [Urine:800]       GENERAL APPEARANCE : alert and cooperative with exam  PHYSICAL EXAMINATION:   I have discussed the care of Brent Ordaz, including pertinent history and exam findings,  with the nursing staff. I have seen  the patient and the key elements of all parts of the encounter have been performed by me. For careful stewardship of limited PPE during COVID-19 pandemic my physical exam was deferred. He was seen and evaluated from outside the room through the window and I have talk to the patient on the phone. General appearance - oriented to person, place, and time, overweight and in no distress. Mental status - alert, oriented to person, place, and time  Chest -mild tachypnea present no retraction or cyanosis.       Diet:  DIET GENERAL;  Dietary Nutrition Supplements: Standard High Calorie Oral Supplement    Medications:Current Inpatient    Scheduled Meds:   dexamethasone  6 mg Oral Daily    sodium chloride flush  10 mL Intravenous 2 times per day    azithromycin  500 mg Intravenous Q24H    And    cefTRIAXone (ROCEPHIN) IV  1 g Intravenous Q24H    influenza virus vaccine  0.5 mL Intramuscular Once    enoxaparin  40 mg Subcutaneous BID     Continuous Infusions:  PRN Meds:benzonatate, loperamide, acetaminophen **OR** acetaminophen, acetaminophen **OR** acetaminophen, albuterol, magnesium hydroxide, nicotine, promethazine **OR** ondansetron, sodium chloride flush, sodium chloride, zolpidem, polyvinyl alcohol  VITAL SIGNS:  LAST:  /64   Pulse 84   Temp 98.4 °F (36.9 °C) (Oral)   Resp 25   Ht 5' 9\" (1.753 m)   Wt 224 lb 13.9 oz (102 kg)   SpO2 93%   BMI 33.21 kg/m²   8-24 HR RANGE:  TEMP Temp  Av.6 °F (37 °C)  Min: 98.3 °F (36.8 °C)  Max: 99.1 °F (82.1 °C)   BP Systolic (25XGT), CFI:500 , Min:102 , EAI:465      Diastolic (85PVH), QTL:05, Min:55, Max:91     PULSE Pulse  Av.8  Min: 79  Max: 97   RR Resp  Av  Min: 25  Max: 25   O2 SAT SpO2  Av.5 %  Min: 93 %  Max: 96 %   OXYGEN DELIVERY O2 Flow Rate (L/min)  Av L/min  Min: 2 L/min  Max: 2 L/min        VENTILATOR SETTINGS:  Vent Information  Skin Assessment: Clean, dry, & intact  FiO2 : 50 %  SpO2: 93 %  SpO2/FiO2 ratio: 188  I Time/ I Time %: 0.9 s  Humidification Source: Heated wire  Humidification Temp: 33  Humidification Temp Measured: 33  Mask Type: Full face mask  Mask Size: Medium     PaO2/FiO2 RATIO:  No results for input(s): POCPO2 in the last 72 hours. FiO2 : 50 %     INPUT/OUTPUT:  In: 850 [P.O.:500; I.V.:350]  Out: 800 [Urine:800]     LABS:  ABGs:   No results for input(s): POCPH, POCPCO2, POCPO2, POCHCO3, QUHR1KHO in the last 72 hours.   CBC:   Recent Labs     10/23/20  0818 10/24/20  0558 10/25/20  0324   WBC 12.1* 13.6* 13.5*   HGB 14.2 13.6 13.9   HCT 43.2 43.2 43.0   MCV 95.8 97.1 94.7   * 498* 601*   LYMPHOPCT 14* 14* 19*   RBC 4.51 4.45 4.54   MCH 31.5 30.6 30.6   MCHC 32.9 31.5 32.3   RDW 12.6 12.5 12.6     CRP:   Recent Labs     10/24/20  0558   CRP 53.0*     LDH:   Recent Labs     10/23/20  0818 10/24/20  0558   * 430*     BMP:   Recent Labs     10/23/20  0818 10/24/20  0558 10/25/20  0324    138 138   K 4.4 4.4 4.5    105 104   CO2 23 21 21   BUN 19 18 17   CREATININE 0.66* 0.58* 0.61*   GLUCOSE 130* 108* 101*   PHOS 3.9 2.9  --      Liver Function Test:   Recent Labs     10/23/20  0818 10/24/20  0558 10/25/20  0324   PROT 6.4 6.3* 6.3*   LABALBU 2.9* 2.7* 2.7*   * 104* 109*   AST 44* 38 43*   ALKPHOS 64 60 62   BILITOT 0.51 0.43 0.45     Coagulation Profile:   Recent Labs     10/23/20  0818 10/24/20  0558 10/25/20  0324   INR 0.9 1.0 0.9   PROTIME 9.8 10.1 9.9   APTT 21.7 22.1 23.5     D-Dimer:  Recent Labs     10/23/20  0818 10/24/20  0558 10/25/20  0324   DDIMER 0.44 0.47 0.64     Ferritin:    Recent Labs     10/23/20  0818 10/24/20  0558   FERRITIN 1,036* 827*     Lactic Acid:  No results for input(s): LACTA in the last 72 hours. Cardiac Enzymes:  Recent Labs     10/23/20  0818 10/24/20  0558   CKTOTAL 72 92     BNP/ProBNP:   No results for input(s): BNP, PROBNP in the last 72 hours. Triglycerides:  No results for input(s): TRIG in the last 72 hours. QTc:     RADIOLOGY:  XR CHEST (SINGLE VIEW FRONTAL)   Final Result   Subtle left lung opacities are nonspecific but could represent pneumonia. Right infrahilar opacity may represent atelectasis versus pneumonia. ASSESSMENT:     Acute hypoxic respiratory failure secondary to COVID-19 pneumonia   Acute respiratory distress syndrome   Bilateral multifocal pneumonia due to COVID 19 infection   Sepsis due to COVID-19.    History of hypertension   History of renal calculi   Obesity/possible obstructive sleep apnea   Hepatic transaminase elevation, improving   Thrombocytosis, improving  PLAN:       We will use nasal cannula 5 to 6 L and alternate with high flow nasal cannula if needed if you desaturate less than 90% we will continue with high flow nasal cannula.  Discussed with nursing staff to have BiPAP at night and intermittently during the daytime as needed or if distress.  Continue with prone positioning at night and also intermittently during the daytime.  Monitor oxygenation, work of breathing and respiratory symptoms.  Will need intermittent Lasix.  Completed remdesivir   Continue with Decadron.  Received convalescent plasma 10/20/2020.  Monitor BMP, Intake/Output with a goal of even/negative fluid balance   Continue with airborne and droplet isolation.  Monitor inflammatory marker ferritin D-dimer LDH fibrinogen LFTs CK and CRP periodically   Obtain X-ray chest as needed    Continue pulmonary toilet, aspiration precautions and bronchodilators   Continue to monitor CBC, coagulation profile   Chemical DVT prophylaxis to continue with Lovenox   Antimicrobials reviewed; on Rocephin and Zithromax per infectious disease   Physical/occupational therapy  His oxygenation is improving and overall improved did not require BiPAP respiratory distress improving and okay to transfer to stepdown unit with careful observation    Discussed with nursing staff, treatment plan discussed with  Discussed with respiratory therapist.    Patient remains critically ill with illness/injury that acutely impairs one or more vital organ systems, such that there is a high probability of imminent or life threatening deterioration in the patient's condition. Critical care time of greater than 30 minutes was spent (excluding procedures), in coordination of care during rounds and discussion of patient care in detail, and recommendations of the team members were adopted in the plan. Necessity of all invasive devices was also confirmed. Chantelle Mathur M.D.    Pulmonary and Critical Care Medicine           10/25/2020, 4:47 PM    This patient was evaluated in the context of the global SARS-CoV-2 (COVID-19) pandemic, which necessitated considerations that the patient either has COVID-19 infection or is at risk of infection with COVID-19. Institutional protocols and algorithms that pertain to the evaluation & management of patients with COVID-19 or those at risk for COVID-19 are in a state of rapid changes based on information released by regulatory bodies including the CDC and federal and state organizations. These policies and algorithms were followed during the patient's care. Please note that this chart was generated using voice recognition Dragon dictation software. Although every effort was made to ensure the accuracy of this automated transcription, some errors in transcription may have occurred.   Walk

## 2020-10-25 NOTE — PROGRESS NOTES
[facility]  Office: [address], DO, Shavonhenrique Puckett, DO, Paulo Brown, DO, Yoko Resendez Fred, DO, Enid Quintero MD, Bg Patino MD, Norris Welch MD, Hank Tavarez MD, Miguel Ángel Suárez MD, Joan Dawn MD, Tammy Conrad MD, Nato Montanez MD, Ranjit Castillo MD, Theresa Mello, DO, Candy Marques MD, Jeovany Pan MD, Dione Pradhan, DO, Arcenio Orozco MD,  Ra Shea DO, Alec Mccormick MD, Michael Simon MD, Karl Arora, CNP, [facility], CNP, Cl Melendez, CNP, Aurelio Bryant, CNS, Kortney Suarez, CNP, Senia Amanda, CNP, Jessie Escamilla, CNP, Devin Perez, CNP, Antionette Pearson, CNP, JUAN J RaoC, Mana Ruiz, [facility], Michelle Rojas, CNP, Steve Salazar, CNP, Nicolas Scruggs, CNP, Ginger Diez, CNP, Ceasar Hsu, [facility]    Progress Note    10/25/2020    11:42 AM    Name:   Kenneth Raymond  MRN:     [id number]     Acct:      [de-identified]   Room:   [address] [place] [address] Day:  5  Admit Date:  10/20/2020 11:14 AM    PCP:   Dyana Lainez MD  Code Status:  Full Code    Subjective:     C/C: No chief complaint on file. SOB  Interval History Status: worsened.      Seen and examined face-to-face,  Discussed with pulmonary,  Infectious disease on board,  Shortness of breath little   On 3L via NC now  Able to walk in room       Brief History:   A 57-year-old gentleman with underlying history of hypertension presented with increased shortness of breath which started few days back, ended up into the ER with increasing need of oxygen, his shortness of breath and cough started on October 9, tested positive for Covid on October 12 subsequently got worse and more short of breath and went to the local ER on October 19 with diffuse Covid related pneumonia, needing oxygen, D-dimer was 820, CT of the chest did not show any PE but bilateral groundglass multifocal pneumonia consistent with Covid 19 enoxaparin  40 mg Subcutaneous BID     Continuous Infusions:   PRN Meds: benzonatate, loperamide, acetaminophen **OR** acetaminophen, acetaminophen **OR** acetaminophen, albuterol, magnesium hydroxide, nicotine, promethazine **OR** ondansetron, sodium chloride flush, sodium chloride, zolpidem, polyvinyl alcohol    Data:     Past Medical History:   has a past medical history of Hypertension and Patient in clinical research study. Social History:   reports that he has quit smoking. He quit after 20.00 years of use. He has never used smokeless tobacco. He reports that he does not drink alcohol or use drugs. Family History: History reviewed. No pertinent family history. Vitals:  BP (!) 120/91   Pulse 79   Temp 98.3 °F (36.8 °C) (Oral)   Resp 28   Ht 5' 9\" (1.753 m)   Wt 224 lb 13.9 oz (102 kg)   SpO2 91%   BMI 33.21 kg/m²   Temp (24hrs), Av.4 °F (36.9 °C), Min:97.3 °F (36.3 °C), Max:99.1 °F (37.3 °C)    No results for input(s): POCGLU in the last 72 hours. I/O (24Hr):     Intake/Output Summary (Last 24 hours) at 10/25/2020 1142  Last data filed at 10/25/2020 2740  Gross per 24 hour   Intake 1810 ml   Output 1500 ml   Net 310 ml       Labs:  Hematology:  Recent Labs     10/23/20  0818 10/24/20  0558 10/25/20  0324   WBC 12.1* 13.6* 13.5*   RBC 4.51 4.45 4.54   HGB 14.2 13.6 13.9   HCT 43.2 43.2 43.0   MCV 95.8 97.1 94.7   MCH 31.5 30.6 30.6   MCHC 32.9 31.5 32.3   RDW 12.6 12.5 12.6   * 498* 601*   MPV 8.5 9.1 8.8   CRP  --  53.0*  --    INR 0.9 1.0 0.9   DDIMER 0.44 0.47 0.64     Chemistry:  Recent Labs     10/23/20  0818 10/24/20  0558 10/25/20  0324    138 138   K 4.4 4.4 4.5    105 104   CO2 23 21 21   GLUCOSE 130* 108* 101*   BUN 19 18 17   CREATININE 0.66* 0.58* 0.61*   ANIONGAP 11 12 13   LABGLOM >60 >60 >60   GFRAA >60 >60 >60   CALCIUM 8.4* 8.4* 8.2*   PHOS 3.9 2.9  --    CKTOTAL 72 92  --      Recent Labs     10/23/20  0818 10/24/20  0558 10/25/20  0324   PROT 6.4 6.3* 6.3*   LABALBU 2.9* 2.7* 2.7*   AST 44* 38 43*   * 104* 109*   * 430*  --    ALKPHOS 64 60 62   BILITOT 0.51 0.43 0.45   URICACID 5.5 5.4  --      ABG:No results found for: POCPH, PHART, PH, POCPCO2, DAP1KYB, PCO2, POCPO2, PO2ART, PO2, POCHCO3, HDD7HCA, HCO3, NBEA, PBEA, BEART, BE, THGBART, THB, VIF2RCM, MQTW1UJM, Y0FSAEHQ, O2SAT, FIO2  Lab Results   Component Value Date/Time    SPECIAL NOT REPORTED 10/22/2020 12:00 PM     No results found for: CULTURE    Radiology:  Xr Chest (single View Frontal)    Result Date: 10/21/2020  Subtle left lung opacities are nonspecific but could represent pneumonia. Right infrahilar opacity may represent atelectasis versus pneumonia. Cta Chest W Contrast    Result Date: 10/19/2020  1. Study degraded by motion. 2. No evidence of central PE or secondary signs of PE. 3. Multilobar groundglass opacities in a peripheral distribution raises suspicion of multilobar pneumonia, potentially of viral etiology. Physical Examination:        General appearance:  alert, cooperative and no distress  Mental Status:  oriented to person, place and time and normal affect  Lungs: Bilateral coarse breath sounds, basal crackles  Heart:  regular rate and rhythm, no murmur  Abdomen:  soft, nontender, nondistended, normal bowel sounds, no masses, hepatomegaly, splenomegaly  Extremities:  no edema, redness, tenderness in the calves  Skin:  no gross lesions, rashes, induration    Assessment:        Hospital Problems           Last Modified POA    * (Principal) Acute respiratory failure with hypoxia ([address] 10/20/2020 Yes    HTN (hypertension), benign 10/20/2020 Yes    Pneumonia due to COVID-19 virus 10/20/2020 Yes    Sepsis due to COVID-19 ([facility] 10/20/2020 Yes    Primary insomnia 10/20/2020 Yes          Plan:        1.  Covid pneumonia, acute respiratory failure, sepsis, infectious disease on board, pulmonary on board, on 3L ox via NC,   2. ID on board,  remdesivir( Finished on 10/24), convalescent plasma given, Decadron,  3. Monitor labs,  4. Ambien for insomnia,  5. DVT prophylaxis,  6.  Full CODE STATUS    Frantz Fisher MD  10/25/2020  11:42 AM

## 2020-10-25 NOTE — PLAN OF CARE
Problem: Skin Integrity:  Goal: Will show no infection signs and symptoms  Description: Will show no infection signs and symptoms  10/25/2020 0247 by Jenni Harvey RN  Outcome: Ongoing  10/24/2020 1733 by Griselda Ming, RN  Outcome: Met This Shift  Goal: Absence of new skin breakdown  Description: Absence of new skin breakdown  10/25/2020 0247 by Jenni Harvey RN  Outcome: Ongoing  10/24/2020 1733 by Griselda Ming, RN  Outcome: Met This Shift     Problem: Falls - Risk of:  Goal: Will remain free from falls  Description: Will remain free from falls  10/25/2020 0247 by Jenni Harvey RN  Outcome: Ongoing  10/24/2020 1733 by Griselda Ming, RN  Outcome: Met This Shift  Goal: Absence of physical injury  Description: Absence of physical injury  10/25/2020 0247 by Jenni Harvey RN  Outcome: Ongoing  10/24/2020 1733 by Griselda Ming, RN  Outcome: Met This Shift     Problem: Airway Clearance - Ineffective  Goal: Achieve or maintain patent airway  10/25/2020 0247 by Jenni Harvey RN  Outcome: Ongoing  10/24/2020 1733 by Griselda Ming, RN  Outcome: Ongoing     Problem: Gas Exchange - Impaired  Goal: Absence of hypoxia  10/25/2020 0247 by Jenni Harvey RN  Outcome: Ongoing  10/24/2020 1733 by Griselda Ming, RN  Outcome: Ongoing  Goal: Promote optimal lung function  10/25/2020 0247 by Jenni Harvey RN  Outcome: Ongoing  10/24/2020 1733 by Griselda Ming, RN  Outcome: Ongoing     Problem: Breathing Pattern - Ineffective  Goal: Ability to achieve and maintain a regular respiratory rate  10/25/2020 0247 by Jenni Harvey RN  Outcome: Ongoing  10/24/2020 1733 by Griselda Ming, RN  Outcome: Ongoing     Problem:  Body Temperature -  Risk of, Imbalanced  Goal: Ability to maintain a body temperature within defined limits  10/25/2020 0247 by Jenni Harvey RN  Outcome: Ongoing  10/24/2020 1733 by Griselda Ming, RN  Outcome: Ongoing  Goal: Will regain or maintain usual level of consciousness  10/25/2020 0247 by Jenni Harvey #1: Predicted inadequate energy intake  Intervention: Food and/or Nutrient Delivery: Continue Current Diet, Start Oral Nutrition Supplement  Nutritional Goals: Pt to conusme >75% of est'd daily needs via PO     10/25/2020 0247 by Louise Dias RN  Outcome: Ongoing  10/24/2020 1733 by Anurag Sims RN  Outcome: Ongoing

## 2020-10-25 NOTE — FLOWSHEET NOTE
Assessment;  Patient is isolated on third floor of CAR unit. Stopped by patient's room. Viewed patient through window glass. Patient was not intubated, He seemed to be sleeping restfully. Intervention;  Prayed for patient as he was resting. Outcome; Though visit was not what  was used to, none-the-less it seemed fruitful. 10/25/20 0953   Encounter Summary   Services provided to: Patient   Referral/Consult From: Ty   Continue Visiting   (10/25/2020)   Complexity of Encounter Low   Length of Encounter 15 minutes   Spiritual Assessment Completed   (No, patient isolated)   Advance Care Planning   (no)   Routine   Type Initial   Assessment Sleeping; Unable to respond   Intervention Prayer;[place]   Outcome Did not respond

## 2020-10-25 NOTE — PROGRESS NOTES
Pts sister updated with all questions and concerns answered.      Electronically signed by Kaylynn Roper RN on 10/25/2020 at 8:48 AM

## 2020-10-26 ENCOUNTER — HOSPITAL ENCOUNTER (OUTPATIENT)
Dept: GENERAL RADIOLOGY | Age: 55
Discharge: HOME | DRG: 871 | End: 2020-10-28
Attending: INTERNAL MEDICINE
Payer: COMMERCIAL

## 2020-10-26 ENCOUNTER — APPOINTMENT (OUTPATIENT)
Dept: GENERAL RADIOLOGY | Age: 55
DRG: 871 | End: 2020-10-26
Attending: INTERNAL MEDICINE
Payer: COMMERCIAL

## 2020-10-26 VITALS
HEIGHT: 69 IN | WEIGHT: 224.87 LBS | BODY MASS INDEX: 33.31 KG/M2 | DIASTOLIC BLOOD PRESSURE: 64 MMHG | OXYGEN SATURATION: 96 % | HEART RATE: 77 BPM | SYSTOLIC BLOOD PRESSURE: 107 MMHG | RESPIRATION RATE: 21 BRPM | TEMPERATURE: 97.7 F

## 2020-10-26 LAB
ABSOLUTE EOS #: 0 K/UL (ref 0–0.4)
ABSOLUTE IMMATURE GRANULOCYTE: 0.68 K/UL (ref 0–0.3)
ABSOLUTE LYMPH #: 2.54 K/UL (ref 1–4.8)
ABSOLUTE MONO #: 1.52 K/UL (ref 0.1–0.8)
ALBUMIN SERPL-MCNC: 3 G/DL (ref 3.5–5.2)
ALBUMIN/GLOBULIN RATIO: 0.9 (ref 1–2.5)
ALP BLD-CCNC: 64 U/L (ref 40–129)
ALT SERPL-CCNC: 96 U/L (ref 5–41)
ANION GAP SERPL CALCULATED.3IONS-SCNC: 13 MMOL/L (ref 9–17)
AST SERPL-CCNC: 30 U/L
BASOPHILS # BLD: 0 % (ref 0–2)
BASOPHILS ABSOLUTE: 0 K/UL (ref 0–0.2)
BILIRUB SERPL-MCNC: 0.45 MG/DL (ref 0.3–1.2)
BUN BLDV-MCNC: 19 MG/DL (ref 6–20)
BUN/CREAT BLD: ABNORMAL (ref 9–20)
CALCIUM SERPL-MCNC: 8.8 MG/DL (ref 8.6–10.4)
CHLORIDE BLD-SCNC: 105 MMOL/L (ref 98–107)
CO2: 22 MMOL/L (ref 20–31)
CREAT SERPL-MCNC: 0.64 MG/DL (ref 0.7–1.2)
D-DIMER QUANTITATIVE: 0.54 MG/L FEU
DIFFERENTIAL TYPE: ABNORMAL
EOSINOPHILS RELATIVE PERCENT: 0 % (ref 1–4)
FIBRINOGEN: 623 MG/DL (ref 140–420)
GFR AFRICAN AMERICAN: >60 ML/MIN
GFR NON-AFRICAN AMERICAN: >60 ML/MIN
GFR SERPL CREATININE-BSD FRML MDRD: ABNORMAL ML/MIN/{1.73_M2}
GFR SERPL CREATININE-BSD FRML MDRD: ABNORMAL ML/MIN/{1.73_M2}
GLUCOSE BLD-MCNC: 115 MG/DL (ref 70–99)
HCT VFR BLD CALC: 42.9 % (ref 40.7–50.3)
HEMOGLOBIN: 14.9 G/DL (ref 13–17)
IMMATURE GRANULOCYTES: 4 %
INR BLD: 0.9
LYMPHOCYTES # BLD: 15 % (ref 24–44)
MCH RBC QN AUTO: 31.3 PG (ref 25.2–33.5)
MCHC RBC AUTO-ENTMCNC: 34.7 G/DL (ref 28.4–34.8)
MCV RBC AUTO: 90.1 FL (ref 82.6–102.9)
MONOCYTES # BLD: 9 % (ref 1–7)
MORPHOLOGY: NORMAL
NRBC AUTOMATED: 0 PER 100 WBC
PARTIAL THROMBOPLASTIN TIME: 22.7 SEC (ref 20.5–30.5)
PDW BLD-RTO: 12.3 % (ref 11.8–14.4)
PLATELET # BLD: 566 K/UL (ref 138–453)
PLATELET ESTIMATE: ABNORMAL
PMV BLD AUTO: 8.6 FL (ref 8.1–13.5)
POTASSIUM SERPL-SCNC: 4.3 MMOL/L (ref 3.7–5.3)
PROTHROMBIN TIME: 9.8 SEC (ref 9–12)
RBC # BLD: 4.76 M/UL (ref 4.21–5.77)
RBC # BLD: ABNORMAL 10*6/UL
SEG NEUTROPHILS: 72 % (ref 36–66)
SEGMENTED NEUTROPHILS ABSOLUTE COUNT: 12.16 K/UL (ref 1.8–7.7)
SODIUM BLD-SCNC: 140 MMOL/L (ref 135–144)
TOTAL PROTEIN: 6.5 G/DL (ref 6.4–8.3)
WBC # BLD: 16.9 K/UL (ref 3.5–11.3)
WBC # BLD: ABNORMAL 10*3/UL

## 2020-10-26 PROCEDURE — 85610 PROTHROMBIN TIME: CPT

## 2020-10-26 PROCEDURE — 6360000002 HC RX W HCPCS: Performed by: INTERNAL MEDICINE

## 2020-10-26 PROCEDURE — 71045 X-RAY EXAM CHEST 1 VIEW: CPT

## 2020-10-26 PROCEDURE — 84550 ASSAY OF BLOOD/URIC ACID: CPT

## 2020-10-26 PROCEDURE — 99232 SBSQ HOSP IP/OBS MODERATE 35: CPT | Performed by: INTERNAL MEDICINE

## 2020-10-26 PROCEDURE — 6370000000 HC RX 637 (ALT 250 FOR IP): Performed by: INTERNAL MEDICINE

## 2020-10-26 PROCEDURE — 82728 ASSAY OF FERRITIN: CPT

## 2020-10-26 PROCEDURE — 85025 COMPLETE CBC W/AUTO DIFF WBC: CPT

## 2020-10-26 PROCEDURE — 83615 LACTATE (LD) (LDH) ENZYME: CPT

## 2020-10-26 PROCEDURE — 84100 ASSAY OF PHOSPHORUS: CPT

## 2020-10-26 PROCEDURE — G0008 ADMIN INFLUENZA VIRUS VAC: HCPCS | Performed by: INTERNAL MEDICINE

## 2020-10-26 PROCEDURE — 85730 THROMBOPLASTIN TIME PARTIAL: CPT

## 2020-10-26 PROCEDURE — 85379 FIBRIN DEGRADATION QUANT: CPT

## 2020-10-26 PROCEDURE — 85384 FIBRINOGEN ACTIVITY: CPT

## 2020-10-26 PROCEDURE — 36415 COLL VENOUS BLD VENIPUNCTURE: CPT

## 2020-10-26 PROCEDURE — 80053 COMPREHEN METABOLIC PANEL: CPT

## 2020-10-26 PROCEDURE — 86140 C-REACTIVE PROTEIN: CPT

## 2020-10-26 PROCEDURE — 90686 IIV4 VACC NO PRSV 0.5 ML IM: CPT | Performed by: INTERNAL MEDICINE

## 2020-10-26 PROCEDURE — 99239 HOSP IP/OBS DSCHRG MGMT >30: CPT | Performed by: INTERNAL MEDICINE

## 2020-10-26 PROCEDURE — 2580000003 HC RX 258: Performed by: NURSE PRACTITIONER

## 2020-10-26 RX ORDER — DEXAMETHASONE 6 MG/1
6 TABLET ORAL DAILY
Qty: 3 TABLET | Refills: 0 | Status: SHIPPED | OUTPATIENT
Start: 2020-10-27 | End: 2020-10-30

## 2020-10-26 RX ORDER — BENZONATATE 200 MG/1
200 CAPSULE ORAL 3 TIMES DAILY PRN
Qty: 30 CAPSULE | Refills: 0 | Status: SHIPPED | OUTPATIENT
Start: 2020-10-26 | End: 2020-11-05

## 2020-10-26 RX ADMIN — ENOXAPARIN SODIUM 40 MG: 40 INJECTION SUBCUTANEOUS at 07:51

## 2020-10-26 RX ADMIN — SODIUM CHLORIDE, PRESERVATIVE FREE 10 ML: 5 INJECTION INTRAVENOUS at 07:51

## 2020-10-26 RX ADMIN — DEXAMETHASONE 6 MG: 4 TABLET ORAL at 07:51

## 2020-10-26 RX ADMIN — INFLUENZA A VIRUS A/VICTORIA/2454/2019 IVR-207 (H1N1) ANTIGEN (PROPIOLACTONE INACTIVATED), INFLUENZA A VIRUS A/HONG KONG/2671/2019 IVR-208 (H3N2) ANTIGEN (PROPIOLACTONE INACTIVATED), INFLUENZA B VIRUS B/VICTORIA/705/2018 BVR-11 ANTIGEN (PROPIOLACTONE INACTIVATED), INFLUENZA B VIRUS B/PHUKET/3073/2013 BVR-1B ANTIGEN (PROPIOLACTONE INACTIVATED) 0.5 ML: 15; 15; 15; 15 INJECTION, SUSPENSION INTRAMUSCULAR at 13:26

## 2020-10-26 ASSESSMENT — ENCOUNTER SYMPTOMS
CHEST TIGHTNESS: 0
EYE ITCHING: 0
PHOTOPHOBIA: 0
WHEEZING: 0
COUGH: 1
SHORTNESS OF BREATH: 0
EYE REDNESS: 0
DIARRHEA: 1
EYE PAIN: 0
ANAL BLEEDING: 0
COLOR CHANGE: 0

## 2020-10-26 NOTE — PROGRESS NOTES
Comprehensive Nutrition Assessment    Type and Reason for Visit:  Reassess    Nutrition Recommendations/Plan: Continue current General Diet as tolerated. Continue high calorie oral nutrition supplement (Ensure Enlive) 2x/d as tolerated. Monitor/encourage intakes. Nutrition Assessment:  Patient sleeping at time of visit. RN reports that patient has a good appetite. Patient consumed % of lunch on Saturday (10/24) and 100% of high calorie oral nutrition supplement (Ensure Enlive). Weight has been stable throughout admission. Possible discharge later this date. Malnutrition Assessment:  Malnutrition Status: At risk for malnutrition  Context:  Acute Illness     Findings of the 6 clinical characteristics of malnutrition:  Energy Intake:  No significant decrease in energy intake  Weight Loss:  No significant weight loss     Body Fat Loss:  No significant body fat loss     Muscle Mass Loss:  No significant muscle mass loss    Fluid Accumulation:  1 - Mild Extremities   Strength:  Not Performed    Estimated Daily Nutrient Needs:  Energy (kcal):  5604-3982 kcal/d (20-22 kcal/kg CBW); Weight Used for Energy Requirements:  Admission     Protein (g):   g protein/d (1.2-1.4 g/kg IBW); Weight Used for Protein Requirements:  Ideal          Nutrition Related Findings:  Labs/Meds reviewed. +1 pitting BLE edema. Last documented BM 10/20. Wounds:  None       Current Nutrition Therapies:    DIET GENERAL;  Dietary Nutrition Supplements: Standard High Calorie Oral Supplement    Anthropometric Measures:  · Height: 5' 9\" (175.3 cm)  · Current Body Weight: 224 lb 13.9 oz (102 kg)   · Admission Body Weight: 227 lb (103 kg)    · Usual Body Weight: 221 lb (100.2 kg) (10/1/19)     · Ideal Body Weight: 160 lbs; % Ideal Body Weight 141.9 %   · BMI: 33.2  · Adjusted Body Weight:  No Adjustment   · BMI Categories: Obese Class 1 (BMI 30.0-34. 9)       Nutrition Diagnosis:   No nutrition diagnosis at this time Nutrition Interventions:   Food and/or Nutrient Delivery:  Continue Current Diet, Continue Oral Nutrition Supplement  Nutrition Education/Counseling:  No recommendation at this time   Coordination of Nutrition Care:  Continued Inpatient Monitoring    Goals:  PO intake to meet % of estimated nutrition needs       Nutrition Monitoring and Evaluation:   Behavioral-Environmental Outcomes:  N/A   Food/Nutrient Intake Outcomes:  Food and Nutrient Intake, Supplement Intake  Physical Signs/Symptoms Outcomes:  Biochemical Data, GI Status, Fluid Status or Edema, Hemodynamic Status, Nutrition Focused Physical Findings, Weight     Discharge Planning:    Continue current diet, Continue Oral Nutrition Supplement     Electronically signed by Ruthie Braxton RD, LD on 10/26/20 at 1:28 PM EDT    Contact: [phone]

## 2020-10-26 NOTE — PROGRESS NOTES
PULMONARY/CRITICAL CARE PROGRESS NOTE:  Patient's name:  Ashleigh Alvarado  Medical Record Number: [id number]  Patient's account/billing number: [de-identified]  Patient's YOB: 1965  Age: 60 y.o. Date of Admission: 10/20/2020 11:14 AM    I personally interviewed/examined the patient, reviewed interval history and interpreted all available radiographic, laboratory data at the time of service. REASON FOR ADMISSION:   COVID-19 pneumonia  Acute hypoxic respiratory failure    LOS: 6    ICU COURSE/INTERVAL HISTORY:10/26/2020   No acute events   He remains hemodynamically stable, he is afebrile last 24 hours and T-max is 99.1 in the last 24 hours. He is gradually improving  Improving shortness of breath and cough  He denies diarrhea or vomiting and nausea. He is sleeping in prone position according to patient at night and denies any problem sleeping he was able to move out of bed to chair. Has been using oxygen by nasal cannula at 2 L/min  He has completed remdesivir and received convalescent plasma x1.   On Decadron        REVIEW OF SYSTEMS -     CONSTITUTIONAL: Negative for  fevers, chills, fatigue, anorexia  EYES:  Negative for  double vision, blurred vision, dry eyes, eye discharge, visual disturbance, irritation, redness, and icterus  HEENT:  Negative for postnasal drip, nasal congestion, epistaxis tinnitus, ear drainage, earaches, sore throat, hoarseness, and voice change  RESPIRATORY: Positive for  dry cough, cough with sputum, dyspnea, negative for wheezing, hemoptysis, chest pain, and pleuritic pain  CARDIOVASCULAR:  Negative for  chest pain, palpitations, orthopnea, PND, exertional chest pressure/discomfort, edema, syncope  GASTROINTESTINAL: Positive for diarrhea, mild abdominal cramps, negative for nausea, vomiting,  constipation,  jaundice, dysphagia, reflux, odynophagia, hematemesis, and hemtochezia  GENITOURINARY:  Negative for frequency, dysuria, nocturia, urinary incontinence, hesitancy, phone.     General appearance - oriented to person, place, and time, overweight and in no distress. Mental status - alert, oriented to person, place, and time  Chest -mild tachypnea present no retraction or cyanosis. Diet:  DIET GENERAL;  Dietary Nutrition Supplements: Standard High Calorie Oral Supplement    Medications:Current Inpatient    Scheduled Meds:   dexamethasone  6 mg Oral Daily    sodium chloride flush  10 mL Intravenous 2 times per day    azithromycin  500 mg Intravenous Q24H    And    cefTRIAXone (ROCEPHIN) IV  1 g Intravenous Q24H    enoxaparin  40 mg Subcutaneous BID     Continuous Infusions:  PRN Meds:benzonatate, loperamide, acetaminophen **OR** acetaminophen, acetaminophen **OR** acetaminophen, albuterol, magnesium hydroxide, nicotine, promethazine **OR** ondansetron, sodium chloride flush, sodium chloride, zolpidem, polyvinyl alcohol  VITAL SIGNS:  LAST:  /64   Pulse 77   Temp 97.7 °F (36.5 °C) (Oral)   Resp 21   Ht 5' 9\" (1.753 m)   Wt 224 lb 13.9 oz (102 kg)   SpO2 96%   BMI 33.21 kg/m²   8-24 HR RANGE:  TEMP Temp  Av.9 °F (36.6 °C)  Min: 97.1 °F (36.2 °C)  Max: 98.4 °F (48.7 °C)   BP Systolic (27QNU), MZD:895 , Min:107 , JYF:318      Diastolic (13OBJ), TWV:79, Min:55, Max:64     PULSE Pulse  Av.5  Min: 64  Max: 82   RR No data recorded   O2 SAT SpO2  Av %  Min: 96 %  Max: 96 %   OXYGEN DELIVERY O2 Flow Rate (L/min)  Av L/min  Min: 2 L/min  Max: 2 L/min        VENTILATOR SETTINGS:  Vent Information  Skin Assessment: Clean, dry, & intact  FiO2 : 50 %  SpO2: 96 %  SpO2/FiO2 ratio: 188  I Time/ I Time %: 0.9 s  Humidification Source: Heated wire  Humidification Temp: 33  Humidification Temp Measured: 33  Mask Type: Full face mask  Mask Size: Medium     PaO2/FiO2 RATIO:  No results for input(s): POCPO2 in the last 72 hours.    FiO2 : 50 %     INPUT/OUTPUT:  In: -   Out: 375 [Urine:375]     LABS:  ABGs:   No results for input(s): POCPH, POCPCO2, POCPO2, Karin Rising in the last 72 hours. CBC:   Recent Labs     10/24/20  0558 10/25/20  0324 10/26/20  0651   WBC 13.6* 13.5* 16.9*   HGB 13.6 13.9 14.9   HCT 43.2 43.0 42.9   MCV 97.1 94.7 90.1   * 601* 566*   LYMPHOPCT 14* 19* 15*   RBC 4.45 4.54 4.76   MCH 30.6 30.6 31.3   MCHC 31.5 32.3 34.7   RDW 12.5 12.6 12.3     CRP:   Recent Labs     10/24/20  0558   CRP 53.0*     LDH:   Recent Labs     10/24/20  0558   *     BMP:   Recent Labs     10/24/20  0558 10/25/20  0324 10/26/20  0651    138 140   K 4.4 4.5 4.3    104 105   CO2 21 21 22   BUN 18 17 19   CREATININE 0.58* 0.61* 0.64*   GLUCOSE 108* 101* 115*   PHOS 2.9  --   --      Liver Function Test:   Recent Labs     10/24/20  0558 10/25/20  0324 10/26/20  0651   PROT 6.3* 6.3* 6.5   LABALBU 2.7* 2.7* 3.0*   * 109* 96*   AST 38 43* 30   ALKPHOS 60 62 64   BILITOT 0.43 0.45 0.45     Coagulation Profile:   Recent Labs     10/24/20  0558 10/25/20  0324 10/26/20  0651   INR 1.0 0.9 0.9   PROTIME 10.1 9.9 9.8   APTT 22.1 23.5 22.7     D-Dimer:  Recent Labs     10/24/20  0558 10/25/20  0324 10/26/20  0651   DDIMER 0.47 0.64 0.54     Ferritin:    Recent Labs     10/24/20  0558   FERRITIN 827*     Lactic Acid:  No results for input(s): LACTA in the last 72 hours. Cardiac Enzymes:  Recent Labs     10/24/20  0558   CKTOTAL 92     BNP/ProBNP:   No results for input(s): BNP, PROBNP in the last 72 hours. Triglycerides:  No results for input(s): TRIG in the last 72 hours. QTc:     RADIOLOGY:  XR CHEST (SINGLE VIEW FRONTAL)   Final Result   Subtle left lung opacities are nonspecific but could represent pneumonia. Right infrahilar opacity may represent atelectasis versus pneumonia. ASSESSMENT:     Acute hypoxic respiratory failure secondary to COVID-19 pneumonia   Acute respiratory distress syndrome   Bilateral multifocal pneumonia due to COVID 19 infection   Sepsis due to COVID-19.    History of hypertension   History generated using voice recognition Dragon dictation software. Although every effort was made to ensure the accuracy of this automated transcription, some errors in transcription may have occurred.   Walk

## 2020-10-26 NOTE — PROGRESS NOTES
Home Oxygen Evaluation    Home Oxygen Evaluation completed. Resting SpO2 on room air = 95%    SpO2 on room air with exercise = 93%    Nocturnal Oximetry with patient on room air is recommended is SpO2 is between 89% and 95% (requires additional order).     Careml Tong  2:23 PM

## 2020-10-26 NOTE — PROGRESS NOTES
[facility] -   Infectious diseases evaluation  admission date 10/20/2020    reason for consultation:   covid    Impression :   Current:  · covid diffuse illness  X 10/9/20  · Diffuse covid pneumonia   · Sepsis from covid  · lymphopenia  · elevated d-dimer 800  · transaminitis from covid    Other:  ·   Discussion / summary of stay / plan of care   ·   Recommendations   Remdesevir x 5 days till 10/24-completed  Post 1 unit of plasma, consented 10/20 and received 10/20 5:30 PM  Rukenny Alejandro 182 study saved  Steroids and Lovenox started since 10/19  With nitric oxide study arterial    · In general starting to stabilize  · Discharge planning      Infection Control Recommendations   · Gregory Precautions  · Contact Isolation   · Airborne isolation  · Droplet Isolation      Antimicrobial Stewardship Recommendations   · Simplification of therapy  · Targeted therapy    Coordination ofOutpatient Care:   · Estimated Length of IV antimicrobials:  · Patient will need Midline / picc Catheter Insertion:   · Patient will need SNF:  · Patient will need outpatient wound care:     History of Present Illness:   Initial history:  Ashleigh Alvarado is a 60y.o.-year-old male w cough and sob x 10/9 and tested + covid 10/12- got worse and more SOB and went to [facility] hospital 10/19 w diffuse covid . pniu and needing O2, D-dimer 820, transferred to us this am, liver enz elevated and lymphopenia, lost wet x ;ast week due to diarrhea, poor appetite till today when he started eating. Fever and dry cough, SOB and diarrhea, no rash, were his symptoms  No sore throat and no poor smell of the food. Liver enz     Interval changes  10/25/2020   Due to the current efforts to prevent transmission of COVID-19 and also the need to preserve PPE for other caregivers, a face-to-face encounter with the patient was not performed.  That being said, all relevant records and diagnostic tests were reviewed, including laboratory results and imaging. Patient was evaluated from the window, called on the phone, and chart reviewed, disc w  involved healthcare workers. He feels really better, no abdominal pain no diarrhea  continues to need some oxygen. 91% saturation on nasal cannula oxygen,  Eating better, wanting better, his aches are down, cough and shortness of breath are down, oriented x3    He signed for the [place] study  Was not a candidate for nitric oxide at this time  Received the immune plasma at 5/30/2010/20, tolerated it well    Summary of relevant labs:  Labs:  WBC 11-12  ALC 0.63    [facility]        Creat normal  D-Dimer 820  Fibrinogen 620    Micro:  covid + [facility] 10/12  Imaging:  CXR 10/21  Subtle left lung opacities are nonspecific but could represent pneumonia.         Right infrahilar opacity may represent atelectasis versus pneumonia         CT chest 10/19 multifocal ground glass covid like pneumonia       I have personally reviewed the past medical history, past surgical history, medications, social history, and family history, and I haveupdated the database accordingly.   Past Medical History:     Past Medical History:   Diagnosis Date    Hypertension     Patient in clinical research study 10/22/2020    Enrolled in [place] study for [employer] expected completion 12/22/2020       Past Surgical  History:     Past Surgical History:   Procedure Laterality Date    LITHOTRIPSY      x4 all together       Medications:      dexamethasone  6 mg Oral Daily    sodium chloride flush  10 mL Intravenous 2 times per day    azithromycin  500 mg Intravenous Q24H    And    cefTRIAXone (ROCEPHIN) IV  1 g Intravenous Q24H    influenza virus vaccine  0.5 mL Intramuscular Once    enoxaparin  40 mg Subcutaneous BID       Social History:     Social History     Socioeconomic History    Marital status: Unknown     Spouse name: Not on file    Number of children: Not on file    Years of education: Not on file    Highest education level: Not on file   Occupational History    Not on file   Social Needs    Financial resource strain: Not on file    Food insecurity     Worry: Not on file     Inability: Not on file    Transportation needs     Medical: Not on file     Non-medical: Not on file   Tobacco Use    Smoking status: Former Smoker     Years: 20.00    Smokeless tobacco: Never Used   Substance and Sexual Activity    Alcohol use: Never     Frequency: Never    Drug use: Never    Sexual activity: Not on file   Lifestyle    Physical activity     Days per week: Not on file     Minutes per session: Not on file    Stress: Not on file   Relationships    Social connections     Talks on phone: Not on file     Gets together: Not on file     Attends Latter-day service: Not on file     Active member of club or organization: Not on file     Attends meetings of clubs or organizations: Not on file     Relationship status: Not on file    Intimate partner violence     Fear of current or ex partner: Not on file     Emotionally abused: Not on file     Physically abused: Not on file     Forced sexual activity: Not on file   Other Topics Concern    Not on file   Social History Narrative    Not on file       Family History:   History reviewed. No pertinent family history. Allergies:   No known allergies     Review of Systems:     Review of Systems   Constitutional: Negative for activity change, appetite change, chills, fatigue and fever. HENT: Negative for congestion. Eyes: Negative for photophobia, pain, redness and itching. Respiratory: Positive for cough. Negative for chest tightness, shortness of breath and wheezing. Cardiovascular: Negative for chest pain. Gastrointestinal: Positive for diarrhea (mushy stools). Negative for anal bleeding. Endocrine: Negative for heat intolerance. Genitourinary: Negative for dysuria. Musculoskeletal: Negative for arthralgias.    Skin: Negative for color change. Allergic/Immunologic: Negative for immunocompromised state. Neurological: Negative for facial asymmetry. Hematological: Negative for adenopathy. Psychiatric/Behavioral: Negative for agitation. Physical Examination :     Patient Vitals for the past 8 hrs:   BP Temp Temp src Pulse Resp SpO2   10/25/20 1953 (!) 136/58 98.4 °F (36.9 °C) Oral -- 22 --   10/25/20 1637 (!) 128/55 98.2 °F (36.8 °C) Oral 82 24 92 %   10/25/20 1329 -- -- -- -- -- 93 %       Physical Exam  Constitutional:       General: He is not in acute distress. Appearance: He is normal weight. He is not ill-appearing. HENT:      Head: Normocephalic and atraumatic. Nose: No congestion. Mouth/Throat:      Mouth: Mucous membranes are moist.   Eyes:      General: No scleral icterus. Conjunctiva/sclera: Conjunctivae normal.      Pupils: Pupils are equal, round, and reactive to light. Pulmonary:      Effort: No respiratory distress. Abdominal:      General: There is no distension. Palpations: Abdomen is soft. Genitourinary:     Comments: No sands  Musculoskeletal:         General: No swelling or deformity. Right lower leg: No edema. Left lower leg: No edema. Skin:     General: Skin is dry. Coloration: Skin is not jaundiced or pale. Neurological:      General: No focal deficit present. Mental Status: He is alert and oriented to person, place, and time. Cranial Nerves: No cranial nerve deficit. Sensory: No sensory deficit. Psychiatric:         Mood and Affect: Mood normal.         Thought Content:  Thought content normal.           Medical Decision Making:   I have independently reviewed/ordered the following labs:    CBC with Differential:   Recent Labs     10/24/20  0558 10/25/20  0324   WBC 13.6* 13.5*   HGB 13.6 13.9   HCT 43.2 43.0   * 601*   LYMPHOPCT 14* 19*   MONOPCT 7 9*     BMP:  Recent Labs     10/24/20  0558 10/25/20  0324    138   K 4.4 4.5   CL 105 104   CO2 21 21   BUN 18 17   CREATININE 0.58* 0.61*     Hepatic Function Panel:   Recent Labs     10/24/20  0558 10/25/20  0324   PROT 6.3* 6.3*   LABALBU 2.7* 2.7*   BILITOT 0.43 0.45   ALKPHOS 60 62   * 109*   AST 38 43*     No results for input(s): RPR in the last 72 hours. No results for input(s): HIV in the last 72 hours. No results for input(s): BC in the last 72 hours. Lab Results   Component Value Date    CREATININE 0.61 10/25/2020    CREATININE 0.72 10/20/2020    GLUCOSE 101 10/25/2020    GLUCOSE 173 10/20/2020       Detailed results: Thank you for allowing us to participate in the care of this patient. Please call with questions. This note is created with the assistance of a speech recognition program.  While intending to generate adocument that actually reflects the content of the visit, the document can still have some errors including those of syntax and sound a like substitutions which may escape proof reading. It such instances, actual meaningcan be extrapolated by contextual diversion.     Vilma Farah MD  Office: [phone]  Perfect serve / office [phone]

## 2020-10-26 NOTE — PROGRESS NOTES
[facility] -   Infectious diseases evaluation  admission date 10/20/2020    reason for consultation:   covid    Impression :   Current:  · covid diffuse illness  X 10/9/20  · Diffuse covid pneumonia   · Sepsis from covid  · lymphopenia  · elevated d-dimer 800  · transaminitis from covid    Other:  ·   Discussion / summary of stay / plan of care   ·   Recommendations   Remdesevir x 5 days till 10/24-completed  Post 1 unit of plasma, consented 10/20 and received 10/20 5:30 PM  Rukenny Alejandro 182 study saved  Steroids and Lovenox started since 10/19  With nitric oxide study arterial    · In general starting to stabilize  · Discharge planning  · Isolate 20 days from start of the illness      Infection Control Recommendations   · [place] Precautions  · Contact Isolation   · Airborne isolation  · Droplet Isolation      Antimicrobial Stewardship Recommendations   · Simplification of therapy  · Targeted therapy    Coordination ofOutpatient Care:   · Estimated Length of IV antimicrobials:  · Patient will need Midline / picc Catheter Insertion:   · Patient will need SNF:  · Patient will need outpatient wound care:     History of Present Illness:   Initial history:  Gris Ivory is a 60y.o.-year-old male w cough and sob x 10/9 and tested + covid 10/12- got worse and more SOB and went to [facility] hospital 10/19 w diffuse covid . pniu and needing O2, D-dimer 820, transferred to us this am, liver enz elevated and lymphopenia, lost wet x ;ast week due to diarrhea, poor appetite till today when he started eating. Fever and dry cough, SOB and diarrhea, no rash, were his symptoms  No sore throat and no poor smell of the food. Liver enz     Interval changes  10/26/2020   Due to the current efforts to prevent transmission of COVID-19 and also the need to preserve PPE for other caregivers, a face-to-face encounter with the patient was not performed.  That being said, all relevant records and diagnostic tests were reviewed, including laboratory results and imaging. Patient was evaluated from the window, called on the phone, and chart reviewed, disc w  involved healthcare workers. Feels better - still has the cough  No fever  Not SOB and no nausea, vomiting, diarrhea   No abd pain - appetite better  Leaving today  tolerated Remdesevir    He signed for the [place] study  Was not a candidate for nitric oxide at this time  Received the immune plasma at 5/30/2010/20, tolerated it well    Summary of relevant labs:  Labs:  WBC 11-12  ALC 0.63    [facility]        Creat normal  D-Dimer 820  Fibrinogen 620    Micro:  covid [facility] 10/12  Imaging:  CXR 10/21  Subtle left lung opacities are nonspecific but could represent pneumonia.         Right infrahilar opacity may represent atelectasis versus pneumonia         CT chest 10/19 multifocal ground glass covid like pneumonia       I have personally reviewed the past medical history, past surgical history, medications, social history, and family history, and I haveupdated the database accordingly.   Past Medical History:     Past Medical History:   Diagnosis Date    Hypertension     Patient in clinical research study 10/22/2020    [facility] [place] [facility] 12/[facility]/2020       Past Surgical  History:     Past Surgical History:   Procedure Laterality Date    LITHOTRIPSY      x4 all together       Medications:      dexamethasone  6 mg Oral Daily    sodium chloride flush  10 mL Intravenous 2 times per day    azithromycin  500 mg Intravenous Q24H    And    cefTRIAXone (ROCEPHIN) IV  1 g Intravenous Q24H    influenza virus vaccine  0.5 mL Intramuscular Once    enoxaparin  40 mg Subcutaneous BID       Social History:     Social History     Socioeconomic History    Marital status: Unknown     Spouse name: Not on file    Number of children: Not on file    Years of education: Not on file    Highest education level: Not on file   Occupational History    Not on file   Social Needs    Financial resource strain: Not on file    Food insecurity     Worry: Not on file     Inability: Not on file    Transportation needs     Medical: Not on file     Non-medical: Not on file   Tobacco Use    Smoking status: Former Smoker     Years: 20.00    Smokeless tobacco: Never Used   Substance and Sexual Activity    Alcohol use: Never     Frequency: Never    Drug use: Never    Sexual activity: Not on file   Lifestyle    Physical activity     Days per week: Not on file     Minutes per session: Not on file    Stress: Not on file   Relationships    Social connections     Talks on phone: Not on file     Gets together: Not on file     Attends Rastafarian service: Not on file     Active member of club or organization: Not on file     Attends meetings of clubs or organizations: Not on file     Relationship status: Not on file    Intimate partner violence     Fear of current or ex partner: Not on file     Emotionally abused: Not on file     Physically abused: Not on file     Forced sexual activity: Not on file   Other Topics Concern    Not on file   Social History Narrative    Not on file       Family History:   History reviewed. No pertinent family history. Allergies:   No known allergies     Review of Systems:     Review of Systems   Constitutional: Negative for activity change, appetite change, chills, fatigue and fever. HENT: Negative for congestion. Eyes: Negative for photophobia, pain, redness and itching. Respiratory: Positive for cough. Negative for chest tightness, shortness of breath and wheezing. Cardiovascular: Negative for chest pain. Gastrointestinal: Positive for diarrhea (mushy stools). Negative for anal bleeding. Endocrine: Negative for heat intolerance. Genitourinary: Negative for dysuria. Musculoskeletal: Negative for arthralgias. Skin: Negative for color change. Allergic/Immunologic: Negative for immunocompromised state. Neurological: Negative for facial asymmetry. Hematological: Negative for adenopathy. Psychiatric/Behavioral: Negative for agitation. Physical Examination :     Patient Vitals for the past 8 hrs:   BP Temp Temp src Pulse Resp SpO2   10/26/20 0745 107/64 -- Oral -- -- 96 %   10/26/20 0502 -- -- -- 77 21 94 %   10/26/20 0501 129/64 97.7 °F (36.5 °C) Oral 67 23 94 %   10/26/20 0500 -- -- -- 64 23 93 %       Physical Exam  Constitutional:       General: He is not in acute distress. Appearance: He is normal weight. He is not ill-appearing. HENT:      Head: Normocephalic and atraumatic. Nose: No congestion. Mouth/Throat:      Mouth: Mucous membranes are moist.   Eyes:      General: No scleral icterus. Conjunctiva/sclera: Conjunctivae normal.      Pupils: Pupils are equal, round, and reactive to light. Pulmonary:      Effort: No respiratory distress. Abdominal:      General: There is no distension. Palpations: Abdomen is soft. Genitourinary:     Comments: No sands  Musculoskeletal:         General: No swelling or deformity. Right lower leg: No edema. Left lower leg: No edema. Skin:     General: Skin is dry. Coloration: Skin is not jaundiced or pale. Neurological:      General: No focal deficit present. Mental Status: He is alert and oriented to person, place, and time. Cranial Nerves: No cranial nerve deficit. Sensory: No sensory deficit. Psychiatric:         Mood and Affect: Mood normal.         Thought Content:  Thought content normal.           Medical Decision Making:   I have independently reviewed/ordered the following labs:    CBC with Differential:   Recent Labs     10/25/20  0324 10/26/20  0651   WBC 13.5* 16.9*   HGB 13.9 14.9   HCT 43.0 42.9   * 566*   LYMPHOPCT 19* 15*   MONOPCT 9* 9*     BMP:  Recent Labs     10/25/20  0324 10/26/20  0651    140   K 4.5 4.3  105   CO2 21 22   BUN 17 19   CREATININE 0.61* 0.64*     Hepatic Function Panel:   Recent Labs     10/25/20  0324 10/26/20  0651   PROT 6.3* 6.5   LABALBU 2.7* 3.0*   BILITOT 0.45 0.45   ALKPHOS 62 64   * 96*   AST 43* 30     No results for input(s): RPR in the last 72 hours. No results for input(s): HIV in the last 72 hours. No results for input(s): BC in the last 72 hours. Lab Results   Component Value Date    CREATININE 0.64 10/26/2020    CREATININE 0.72 10/20/2020    GLUCOSE 115 10/26/2020    GLUCOSE 173 10/20/2020       Detailed results: Thank you for allowing us to participate in the care of this patient. Please call with questions. This note is created with the assistance of a speech recognition program.  While intending to generate adocument that actually reflects the content of the visit, the document can still have some errors including those of syntax and sound a like substitutions which may escape proof reading. It such instances, actual meaningcan be extrapolated by contextual diversion.     Beto Chong MD  Office: [phone]  Perfect serve / office [phone]

## 2020-10-26 NOTE — DISCHARGE SUMMARY
[facility]  Office: [address], DO, Lucia Pozo DO, Shanna Romero, DO, Raimundo Ibarra, DO, Greg Sun MD, Sp Tian MD, Sue Schaeffer MD, Ayanna Emery MD, Nuvia Mejia MD, Star Tony MD, Ayaz Young MD, Mercedes Scales MD, Ranjit Evans MD, Anshu Bender DO, Charo Sun MD, Wendy Sanchez MD, Delmi Hurley DO, Alejandro Max MD,  Sedrick Diehl DO, Еактерина Barragan MD, Balta Blanco MD, Eric Dietirch, [facility], [facility], CNP, Jeffery Hall, CNP, Saeid Stack, CNS, Lia Morgan, CNP, Claude Leep, CNP, Ros Marte, CNP, Lynnette Wellington, CNP, Ubaldo Naik, CNP, Mikhail Fortune PA-C, Christiana Vides, [facility] [place], Rula Chavez, CNP, Yola Leary, CNP, Craig Wilson, CNP, Porfirio Oleary, CNP, Noel Dutta, [facility]    Discharge Summary     Patient ID: Ashleigh Alvarado  :  1965   MRN: [id number]     ACCOUNT:  [de-identified]   Patient's PCP: Missael Brandt MD  Admit Date: 10/20/2020   Discharge Date: 10/26/2020     Length of Stay: 6  Code Status:  Full Code  Admitting Physician: Wendy Sanchez MD  Discharge Physician: Wendy Sanchez MD     Active Discharge Diagnoses:     Hospital Problem Lists:  Principal Problem:    Acute respiratory failure with hypoxia ([facility] [address]  Active Problems:    HTN (hypertension), benign    Pneumonia due to COVID-19 virus    Sepsis due to COVID-19 [facility])    Primary insomnia  Resolved Problems:    * No resolved hospital problems. *      Admission Condition:  critical     Discharged Condition: stable    Hospital Stay:     Hospital Course:  Ashleigh Alvarado is a 60 y.o. male who was admitted for the management of   Acute respiratory failure with hypoxia ([facility] [address] , presented to ER with No chief complaint on file.   A 78-year-old gentleman with underlying history of hypertension presented with increased shortness of breath which started few days back, ended up into the ER with increasing need of oxygen, his shortness of breath and cough started on October 9, tested positive for Covid on October 12 subsequently got worse and more short of breath and went to the local ER on October 19 with diffuse Covid related pneumonia, needing oxygen, D-dimer was 820, CT of the chest did not show any PE but bilateral groundglass multifocal pneumonia consistent with Covid 19 pneumonia.,  Transferred to [facility] for further management,  Has already been seen by infectious disease and started on remdesivir, patient already started feeling better     October 22,  Acute respiratory failure,  Sepsis due to Covid pneumonia  Covid pneumonia,  Hypertension,  Obesity,  History of smoking,  Shortness of breath that has been improving today,  Patient on remdesivir, already got the plasma, Decadron     October 23,  Acute respiratory failure,  Sepsis due to Covid pneumonia  Covid pneumonia,  Hypertension,  Obesity,  History of smoking,  Shortness of breath that has been improving   Patient on remdesivir, already got the plasma, Decadron     Oct 24,  Acute respiratory failure, on 5L via NC  Sepsis due to Covid pneumonia  Covid pneumonia,  Hypertension,  Obesity,  History of smoking,  Shortness of breath that has been improving   Patient on remdesivir, already got the plasma, Decadron,     Oct 25,  Acute respiratory failure, on 3L via [place]  Sepsis due to Covid pneumonia  Covid pneumonia,  Hypertension,  Obesity,  History of smoking,  Shortness of breath that has been improving   Patient on remdesivir, already got the plasma, Decadron,      Oct 26,  Dc home today  Didn't qualify for oxygen      Possible soon         Significant therapeutic interventions:   1. Covid pneumonia, acute respiratory failure, sepsis, infectious disease on board, pulmonary on board,off oxygen now, home o2 done,   2. ID on board,  remdesivir( Finished on 10/24), convalescent plasma given, Decadron,  3. Monitor labs,  4.  Ambien for UROBILINOGEN 0.2 10/19/2020    LEUKOCYTESUR Negative 10/19/2020     TSH:  No results found for: TSH       Radiology:  Xr Chest (single View Frontal)    Result Date: 10/21/2020  Subtle left lung opacities are nonspecific but could represent pneumonia. Right infrahilar opacity may represent atelectasis versus pneumonia. Consultations:    Consults:     Final Specialist Recommendations/Findings:   IP CONSULT TO INFECTIOUS DISEASES  IP CONSULT TO PULMONOLOGY      The patient was seen and examined on day of discharge and this discharge summary is in conjunction with any daily progress note from day of discharge. Discharge plan:     Disposition: Home    Physician Follow Up:     [address] [facility]  [address] [address]  [phone]    Wed.  11-18-20 at 1:00pm     Francisco Sam MD  [facility] [address]  [phone]    In 1 week      Delilah Valentino[address] [address]  [phone]    In 1 week         Requiring Further Evaluation/Follow Up POST HOSPITALIZATION/Incidental Findings:     Diet: regular diet    Activity: As tolerated    Instructions to Patient:     Discharge Medications:      Medication List      START taking these medications    dexamethasone 6 MG tablet  Commonly known as:  DECADRON  Take 1 tablet by mouth daily for 3 days  Start taking on:  October 27, 2020        CONTINUE taking these medications    benzonatate 200 MG capsule  Commonly known as:  TESSALON  Take 1 capsule by mouth 3 times daily as needed for Cough     lisinopril-hydroCHLOROthiazide 20-12.5 MG per tablet  Commonly known as:  PRINZIDE;ZESTORETIC           Where to Get Your Medications      These medications were sent to [facility] [address] [facility], [address] [facility]  [address], [address] [address]    Phone:  [phone]   · benzonatate 200 MG capsule  · dexamethasone 6 MG tablet         No discharge procedures on file. Time Spent on discharge is  36 mins in patient examination, evaluation, counseling as well as medication reconciliation, prescriptions for required medications, discharge plan and follow up. Electronically signed by   Liam Khan MD  10/26/2020  4:42 PM      Thank you Dr. Aurelia Wilson MD for the opportunity to be involved in this patient's care.

## 2020-10-26 NOTE — CARE COORDINATION
Anticipating dc home today ps dr Aiyana Urias requesting home o2 eval. o2 L NC    16:59 did not qualify for home o2

## 2020-10-26 NOTE — PLAN OF CARE
Nutrition Problem #1: No nutrition diagnosis at this time  Intervention: Food and/or Nutrient Delivery: Continue Current Diet, Continue Oral Nutrition Supplement  Nutritional Goals: PO intake to meet % of estimated nutrition needs

## 2020-10-26 NOTE — PROGRESS NOTES
CLINICAL PHARMACY NOTE: MEDS TO [address] Select Patient?: No  Total # of Prescriptions Filled: 2   The following medications were delivered to the patient:  · Benzonatate 200  · Dexamethasone 6mg  Total # of Interventions Completed: 0  Time Spent (min): 5    Additional Documentation:  Gave meds to nurse

## 2020-10-27 ENCOUNTER — CARE COORDINATION (OUTPATIENT)
Dept: CASE MANAGEMENT | Age: 55
End: 2020-10-27

## 2020-10-27 LAB
C-REACTIVE PROTEIN: 51.7 MG/L (ref 0–5)
FERRITIN: 849 UG/L (ref 30–400)
LACTATE DEHYDROGENASE: 322 U/L (ref 135–225)
PHOSPHORUS: 3.8 MG/DL (ref 2.5–4.5)
URIC ACID: 6 MG/DL (ref 3.4–7)

## 2020-10-27 NOTE — CARE COORDINATION
Jing 45 Transitions Initial Follow Up Call    Call within 2 business days of discharge: Yes    Patient: Francia Dickson Patient : 1965   MRN: [id number]  Reason for Admission: COVID 19 PNE  Discharge Date: 10/26/20 RARS: Readmission Risk Score: 16      Last Discharge [facility]       Complaint Diagnosis Description Type Department Provider    10/20/20  COVID-19 . .. Admission (Discharged) [facility] CAR 3 Martin Temple MD           Attempted initial 24 hour transitional call to patient. Left VM to return call directly to [phone]. 1st attempt.      Amanda Vasquez RN
by a [facility] Pharmacist?:  No  Have you scheduled your follow up appointment?:  No  Were you discharged with any Home Care or Post Acute Services:  No  Do you feel like you have everything you need to keep you well at home?:  Yes  Care Transitions Interventions         Follow Up  Future Appointments   Date Time Provider Acosta Carrillo   1/26/2021  8:00 AM Rinku Eden MD [facility] Rosi Bateman RN

## 2020-10-28 ENCOUNTER — FOLLOWUP TELEPHONE ENCOUNTER (OUTPATIENT)
Dept: RESEARCH | Age: 55
End: 2020-10-28

## 2020-11-03 LAB
EKG ATRIAL RATE: 85 BPM
EKG P AXIS: 23 DEGREES
EKG P-R INTERVAL: 130 MS
EKG Q-T INTERVAL: 382 MS
EKG QRS DURATION: 108 MS
EKG QTC CALCULATION (BAZETT): 454 MS
EKG R AXIS: -13 DEGREES
EKG T AXIS: -3 DEGREES
EKG VENTRICULAR RATE: 85 BPM

## 2020-11-04 ENCOUNTER — FOLLOWUP TELEPHONE ENCOUNTER (OUTPATIENT)
Dept: RESEARCH | Age: 55
End: 2020-11-04

## 2020-11-09 LAB — RESEARCH KIT: NORMAL

## 2020-11-18 ENCOUNTER — HOSPITAL ENCOUNTER (OUTPATIENT)
Dept: GENERAL RADIOLOGY | Age: 55
Discharge: HOME | End: 2020-11-20
Payer: COMMERCIAL

## 2020-11-18 ENCOUNTER — HOSPITAL ENCOUNTER (OUTPATIENT)
Dept: RESEARCH | Age: 55
Discharge: HOME | End: 2020-11-18
Payer: COMMERCIAL

## 2020-11-18 ENCOUNTER — FOLLOWUP TELEPHONE ENCOUNTER (OUTPATIENT)
Dept: RESEARCH | Age: 55
End: 2020-11-18

## 2020-11-18 VITALS
OXYGEN SATURATION: 96 % | TEMPERATURE: 98.4 F | SYSTOLIC BLOOD PRESSURE: 177 MMHG | HEART RATE: 95 BPM | DIASTOLIC BLOOD PRESSURE: 89 MMHG | RESPIRATION RATE: 24 BRPM

## 2020-11-18 LAB
ABSOLUTE EOS #: 0.38 K/UL (ref 0–0.44)
ABSOLUTE IMMATURE GRANULOCYTE: 0.03 K/UL (ref 0–0.3)
ABSOLUTE LYMPH #: 3.7 K/UL (ref 1.1–3.7)
ABSOLUTE MONO #: 0.81 K/UL (ref 0.1–1.2)
ALBUMIN SERPL-MCNC: 4.1 G/DL (ref 3.5–5.2)
ALBUMIN/GLOBULIN RATIO: 1.4 (ref 1–2.5)
ALP BLD-CCNC: 76 U/L (ref 40–129)
ALT SERPL-CCNC: 52 U/L (ref 5–41)
ANION GAP SERPL CALCULATED.3IONS-SCNC: 20 MMOL/L (ref 9–17)
AST SERPL-CCNC: 33 U/L
BASOPHILS # BLD: 1 % (ref 0–2)
BASOPHILS ABSOLUTE: 0.04 K/UL (ref 0–0.2)
BILIRUB SERPL-MCNC: 0.53 MG/DL (ref 0.3–1.2)
BUN BLDV-MCNC: 14 MG/DL (ref 6–20)
BUN/CREAT BLD: ABNORMAL (ref 9–20)
C-REACTIVE PROTEIN: 2.2 MG/L (ref 0–5)
CALCIUM SERPL-MCNC: 9.5 MG/DL (ref 8.6–10.4)
CHLORIDE BLD-SCNC: 106 MMOL/L (ref 98–107)
CO2: 18 MMOL/L (ref 20–31)
CREAT SERPL-MCNC: 0.73 MG/DL (ref 0.7–1.2)
D-DIMER QUANTITATIVE: 0.46 MG/L FEU
DIFFERENTIAL TYPE: ABNORMAL
EOSINOPHILS RELATIVE PERCENT: 5 % (ref 1–4)
FERRITIN: 409 UG/L (ref 30–400)
FIBRINOGEN: 375 MG/DL (ref 140–420)
GFR AFRICAN AMERICAN: >60 ML/MIN
GFR NON-AFRICAN AMERICAN: >60 ML/MIN
GFR SERPL CREATININE-BSD FRML MDRD: ABNORMAL ML/MIN/{1.73_M2}
GFR SERPL CREATININE-BSD FRML MDRD: ABNORMAL ML/MIN/{1.73_M2}
GLUCOSE BLD-MCNC: 85 MG/DL (ref 70–99)
HCT VFR BLD CALC: 38.5 % (ref 40.7–50.3)
HEMOGLOBIN: 13.1 G/DL (ref 13–17)
IMMATURE GRANULOCYTES: 0 %
INR BLD: 0.9
LACTATE DEHYDROGENASE: 260 U/L (ref 135–225)
LYMPHOCYTES # BLD: 45 % (ref 24–43)
MCH RBC QN AUTO: 31.7 PG (ref 25.2–33.5)
MCHC RBC AUTO-ENTMCNC: 34 G/DL (ref 28.4–34.8)
MCV RBC AUTO: 93.2 FL (ref 82.6–102.9)
MONOCYTES # BLD: 10 % (ref 3–12)
NRBC AUTOMATED: 0 PER 100 WBC
PARTIAL THROMBOPLASTIN TIME: 24.7 SEC (ref 20.5–30.5)
PDW BLD-RTO: 14.3 % (ref 11.8–14.4)
PHOSPHORUS: 3.3 MG/DL (ref 2.5–4.5)
PLATELET # BLD: 331 K/UL (ref 138–453)
PLATELET ESTIMATE: ABNORMAL
PMV BLD AUTO: 8.3 FL (ref 8.1–13.5)
POTASSIUM SERPL-SCNC: 3.6 MMOL/L (ref 3.7–5.3)
PROTHROMBIN TIME: 9.6 SEC (ref 9–12)
RBC # BLD: 4.13 M/UL (ref 4.21–5.77)
RBC # BLD: ABNORMAL 10*6/UL
SEG NEUTROPHILS: 39 % (ref 36–65)
SEGMENTED NEUTROPHILS ABSOLUTE COUNT: 3.12 K/UL (ref 1.5–8.1)
SODIUM BLD-SCNC: 144 MMOL/L (ref 135–144)
TOTAL PROTEIN: 7.1 G/DL (ref 6.4–8.3)
URIC ACID: 6.8 MG/DL (ref 3.4–7)
WBC # BLD: 8.1 K/UL (ref 3.5–11.3)
WBC # BLD: ABNORMAL 10*3/UL

## 2020-11-18 PROCEDURE — 83615 LACTATE (LD) (LDH) ENZYME: CPT

## 2020-11-18 PROCEDURE — 71046 X-RAY EXAM CHEST 2 VIEWS: CPT

## 2020-11-18 PROCEDURE — 85025 COMPLETE CBC W/AUTO DIFF WBC: CPT

## 2020-11-18 PROCEDURE — 85730 THROMBOPLASTIN TIME PARTIAL: CPT

## 2020-11-18 PROCEDURE — 85384 FIBRINOGEN ACTIVITY: CPT

## 2020-11-18 PROCEDURE — 84550 ASSAY OF BLOOD/URIC ACID: CPT

## 2020-11-18 PROCEDURE — 85379 FIBRIN DEGRADATION QUANT: CPT

## 2020-11-18 PROCEDURE — 82728 ASSAY OF FERRITIN: CPT

## 2020-11-18 PROCEDURE — 86140 C-REACTIVE PROTEIN: CPT

## 2020-11-18 PROCEDURE — 85610 PROTHROMBIN TIME: CPT

## 2020-11-18 PROCEDURE — 80053 COMPREHEN METABOLIC PANEL: CPT

## 2020-11-18 PROCEDURE — 84100 ASSAY OF PHOSPHORUS: CPT

## 2020-11-18 NOTE — TELEPHONE ENCOUNTER
Clinical Research Services  November 11, 2020  10:00    [id number]. A Randomized, Double-blind, Placebo controlled Study to Assess the Safety and Efficacy of Pulsed, Inhaled Nitric Oxide (Johanna) versus Placebo in Subjects with Mild or Moderate Coronavirus Disease (COVID19)  [facility] # [id number]      NCT # [id number]    Day 21 Phone Call    I spoke with the patient - he reports feeling better each day, he has returned to work at the [employer]. He is walking to and from work and is on light duty - he reports feeling totally exhausted after work. He is not using supplemental oxygen. He does experience shortness of breath with strenuous activity. He denies any new issues, he continues to take the Lisinopril daily and the Tessulon only as needed for cough. He was reminded of the next study visit on 11/18/2020 in the [facility], he voices understanding. He will call the Research Office with any questions or problems.                  ANDREW Celis RN  Clinical Research Nurse

## 2020-11-18 NOTE — RESEARCH
Clinical Research Services  Patient Name: Brent Ordaz  MRN: [id number]  YOB: 1965  Date of Evaluation: 11/18/2020  Time of Evaluation: [id number]  Reason for evaluation: Post-Discharge Day 28 Clinic Visit    [facility] [id number]:  A Phase II, Randomized, Double-Blind, Placebo-Controlled,  Multicenter Study to Evaluate the Safety and Efficacy of [id number] or [id number] in [place] with Severe COVID-19 Pneumonia  NCT # [id number]    :  Elizabeth Hargrove M.D. Subject here for Post-Discharge Day 28 Clinic Visit. He remains on room air. He reports he is back to work 1/2 days but returns home fatigued. He also reports a continued slight cough, muscle soreness at times with walking and sometimes a daily headache. He states that this has been his usual since coming down with COVID-19 so not adverse events. He does walk to and from work. He plans to work full days next week as it will be a short week and more likely to be manageable. Vital signs obtained. BP elevated but he is emotional due to returning here after his ordeal while hospitalized. It brings back lots of memories and emotions. BP repeated with a noteable decrease. EKG obtained per Dr. Abraham Peng request.  Blood drawn for local and Central labs. Naopharyngeal swab obtained via both nostrils. Escorted to Outpatient Radiology and x-ray, 2 views obtained per Dr. Abraham Peng order. Agreeable to phone call next week for Day 35, and we will schedule his Day 60 visit during that phone call. For questions Jamie Alaniz, Dr. Piter Mcfarland, Dr. Gris Romero, Dr. Elton Paz, or Dr. Kassie Bullard or call the Research Office at [phone] Monday - Friday 8 am till 5 pm to speak to the Research Nurse.     Belvie Jeans, RN  Clinical Research Nurse

## 2020-11-24 ENCOUNTER — FOLLOWUP TELEPHONE ENCOUNTER (OUTPATIENT)
Dept: RESEARCH | Age: 55
End: 2020-11-24

## 2020-11-25 NOTE — TELEPHONE ENCOUNTER
Clinical Research Services  Patient Name: Narcisa Rg  MRN: [id number]  YOB: 1965  Date of Evaluation: 11/24/2020  Time of Evaluation: 1900  Reason for evaluation: Day 35 Follow Up Phone Call    [facility] [id number]:  A Phase II, Randomized, Double-Blind, Placebo-Controlled,  Multicenter Study to Evaluate the Safety and Efficacy of [id number] or [id number] in [place] with Severe COVID-19 Pneumonia  NCT # [id number]    I spoke with the patient. He reports slowly feeling better each day - he was able to climb the stairs at work today. O2 saturation 93% on room air, pulse 86. He denies any new problems. He continues to take his blood pressure medication, states that he has not used any cough medication in the last few weeks. He reports that going out into the fresh air makes him cough, so he wears a scarf over his face mask and that eliminates the issue. He voices understanding to call the Research office with any questions or problems.            ANDREW Adams RN  Clinical Research Nurse

## 2020-12-17 ENCOUNTER — HOSPITAL ENCOUNTER (OUTPATIENT)
Dept: RESEARCH | Age: 55
Discharge: HOME | End: 2020-12-17
Payer: COMMERCIAL

## 2020-12-17 ENCOUNTER — HOSPITAL ENCOUNTER (OUTPATIENT)
Dept: GENERAL RADIOLOGY | Age: 55
Discharge: HOME | End: 2020-12-19
Payer: COMMERCIAL

## 2020-12-17 VITALS
OXYGEN SATURATION: 95 % | SYSTOLIC BLOOD PRESSURE: 155 MMHG | RESPIRATION RATE: 16 BRPM | DIASTOLIC BLOOD PRESSURE: 72 MMHG | TEMPERATURE: 98.1 F | HEART RATE: 84 BPM

## 2020-12-17 LAB
ABSOLUTE EOS #: 0.22 K/UL (ref 0–0.44)
ABSOLUTE IMMATURE GRANULOCYTE: 0.03 K/UL (ref 0–0.3)
ABSOLUTE LYMPH #: 2.97 K/UL (ref 1.1–3.7)
ABSOLUTE MONO #: 0.63 K/UL (ref 0.1–1.2)
ALBUMIN SERPL-MCNC: 4.2 G/DL (ref 3.5–5.2)
ALBUMIN/GLOBULIN RATIO: 1.6 (ref 1–2.5)
ALP BLD-CCNC: 68 U/L (ref 40–129)
ALT SERPL-CCNC: 54 U/L (ref 5–41)
ANION GAP SERPL CALCULATED.3IONS-SCNC: 20 MMOL/L (ref 9–17)
AST SERPL-CCNC: 37 U/L
BASOPHILS # BLD: 1 % (ref 0–2)
BASOPHILS ABSOLUTE: 0.04 K/UL (ref 0–0.2)
BILIRUB SERPL-MCNC: 0.65 MG/DL (ref 0.3–1.2)
BUN BLDV-MCNC: 17 MG/DL (ref 6–20)
BUN/CREAT BLD: ABNORMAL (ref 9–20)
C-REACTIVE PROTEIN: <3 MG/L (ref 0–5)
CALCIUM SERPL-MCNC: 9.2 MG/DL (ref 8.6–10.4)
CHLORIDE BLD-SCNC: 108 MMOL/L (ref 98–107)
CO2: 18 MMOL/L (ref 20–31)
CREAT SERPL-MCNC: 0.76 MG/DL (ref 0.7–1.2)
D-DIMER QUANTITATIVE: 0.34 MG/L FEU
DIFFERENTIAL TYPE: NORMAL
EOSINOPHILS RELATIVE PERCENT: 3 % (ref 1–4)
FERRITIN: 210 UG/L (ref 30–400)
FIBRINOGEN: 302 MG/DL (ref 140–420)
GFR AFRICAN AMERICAN: >60 ML/MIN
GFR NON-AFRICAN AMERICAN: >60 ML/MIN
GFR SERPL CREATININE-BSD FRML MDRD: ABNORMAL ML/MIN/{1.73_M2}
GFR SERPL CREATININE-BSD FRML MDRD: ABNORMAL ML/MIN/{1.73_M2}
GLUCOSE BLD-MCNC: 138 MG/DL (ref 70–99)
HCT VFR BLD CALC: 43.7 % (ref 40.7–50.3)
HEMOGLOBIN: 14.4 G/DL (ref 13–17)
IMMATURE GRANULOCYTES: 0 %
INR BLD: 0.9
LACTATE DEHYDROGENASE: 258 U/L (ref 135–225)
LYMPHOCYTES # BLD: 37 % (ref 24–43)
MCH RBC QN AUTO: 31.4 PG (ref 25.2–33.5)
MCHC RBC AUTO-ENTMCNC: 33 G/DL (ref 28.4–34.8)
MCV RBC AUTO: 95.2 FL (ref 82.6–102.9)
MONOCYTES # BLD: 8 % (ref 3–12)
NRBC AUTOMATED: 0 PER 100 WBC
PARTIAL THROMBOPLASTIN TIME: 24.5 SEC (ref 20.5–30.5)
PDW BLD-RTO: 13.8 % (ref 11.8–14.4)
PHOSPHORUS: 2 MG/DL (ref 2.5–4.5)
PLATELET # BLD: 351 K/UL (ref 138–453)
PLATELET ESTIMATE: NORMAL
PMV BLD AUTO: 8.7 FL (ref 8.1–13.5)
POTASSIUM SERPL-SCNC: 3.4 MMOL/L (ref 3.7–5.3)
PROTHROMBIN TIME: 9.8 SEC (ref 9–12)
RBC # BLD: 4.59 M/UL (ref 4.21–5.77)
RBC # BLD: NORMAL 10*6/UL
SEG NEUTROPHILS: 51 % (ref 36–65)
SEGMENTED NEUTROPHILS ABSOLUTE COUNT: 4.14 K/UL (ref 1.5–8.1)
SODIUM BLD-SCNC: 146 MMOL/L (ref 135–144)
TOTAL PROTEIN: 6.9 G/DL (ref 6.4–8.3)
URIC ACID: 8.4 MG/DL (ref 3.4–7)
WBC # BLD: 8 K/UL (ref 3.5–11.3)
WBC # BLD: NORMAL 10*3/UL

## 2020-12-17 PROCEDURE — 83615 LACTATE (LD) (LDH) ENZYME: CPT

## 2020-12-17 PROCEDURE — 82728 ASSAY OF FERRITIN: CPT

## 2020-12-17 PROCEDURE — 85610 PROTHROMBIN TIME: CPT

## 2020-12-17 PROCEDURE — 86140 C-REACTIVE PROTEIN: CPT

## 2020-12-17 PROCEDURE — 85025 COMPLETE CBC W/AUTO DIFF WBC: CPT

## 2020-12-17 PROCEDURE — 84100 ASSAY OF PHOSPHORUS: CPT

## 2020-12-17 PROCEDURE — 80053 COMPREHEN METABOLIC PANEL: CPT

## 2020-12-17 PROCEDURE — 71046 X-RAY EXAM CHEST 2 VIEWS: CPT

## 2020-12-17 PROCEDURE — 84550 ASSAY OF BLOOD/URIC ACID: CPT

## 2020-12-17 PROCEDURE — 85379 FIBRIN DEGRADATION QUANT: CPT

## 2020-12-17 PROCEDURE — 85384 FIBRINOGEN ACTIVITY: CPT

## 2020-12-17 PROCEDURE — 85730 THROMBOPLASTIN TIME PARTIAL: CPT

## 2020-12-17 RX ORDER — SULFAMETHOXAZOLE AND TRIMETHOPRIM 200; 40 MG/5ML; MG/5ML
160 SUSPENSION ORAL 2 TIMES DAILY
COMMUNITY
End: 2020-12-17 | Stop reason: ALTCHOICE

## 2020-12-17 NOTE — RESEARCH
Clinical Research Services  Patient Name: Ludmila Mckeon  MRN: [id number]  YOB: 1965  Date of Evaluation: 12/17/2020  Time of Evaluation: 1030  Reason for evaluation: Post Discharge Day 60 Study Completion Clinic Visit    [facility] [id number]:  A Phase II, Randomized, Double-Blind, Placebo-Controlled,  Multicenter Study to Evaluate the Safety and Efficacy of [id number] or [id number] in [place] with Severe COVID-19 Pneumonia  NCT # [id number]    :  Kristin Arnold M.D. Subject here for Post Discharge Day 61 Study Completion Clinic Visit. He Remains on room air. He reports that he is back to work full time. Patient denies any other symptoms at this time except shortness of breath when walking a long distance. Patient states this is greatly improved since his hospitalization. Adverse event reported of dry, scaly skin to bilat palms and lower legs. This started approx 12/5/2020 and lasted one week after treatment with OTC lotion. Vital signs obtained. EKG, chest xray, local and central labs obtained per Dr Romana Chang orders. _____________________________    For questions [facility] Dr. Sharon Noland, Dr. Cheko Mcgee, Dr. Kathy Quintero, Dr. Dian Mcardle, or Dr. Saranya Waller or call the Research Office at [phone] Monday - Friday 8 am till 5 pm to speak to the Research Nurse.     Navin Ruvalcaba RN  Clinical Research Nurse

## 2021-01-19 PROBLEM — U07.1 COVID-19: Status: RESOLVED | Noted: 2020-10-12 | Resolved: 2021-01-19

## 2021-01-19 PROBLEM — J12.82 PNEUMONIA DUE TO COVID-19 VIRUS: Status: RESOLVED | Noted: 2020-10-19 | Resolved: 2021-01-19

## 2021-01-19 PROBLEM — J96.01 ACUTE RESPIRATORY FAILURE WITH HYPOXIA (HCC): Status: RESOLVED | Noted: 2020-10-20 | Resolved: 2021-01-19

## 2021-01-19 PROBLEM — U07.1 PNEUMONIA DUE TO COVID-19 VIRUS: Status: RESOLVED | Noted: 2020-10-19 | Resolved: 2021-01-19

## 2021-12-13 NOTE — TELEPHONE ENCOUNTER
Elian Sanderspblolly [facility] [phone] [id number] Day 7 follow-up call completed. Subject went home on room air. He gets short of breath with activity and feels about 40% of his normal. Checked O2 sat and pulse on phone, 90% and 91 accordingly. He is still having insomnia and will try OTC Benadryl or will contact PCP for Ambien. He relates this to the Decadron and his last dose will be tomorrow. He is still on Tessalon and HTN medication. He did not get prescription for an inhaler at discharge.  He is agreeable to the weekly phone calls and will plan on a Day 28 visit at the research office on November 18th at [address] [place] [address].
65.8